# Patient Record
Sex: MALE | Race: WHITE | NOT HISPANIC OR LATINO | Employment: FULL TIME | URBAN - METROPOLITAN AREA
[De-identification: names, ages, dates, MRNs, and addresses within clinical notes are randomized per-mention and may not be internally consistent; named-entity substitution may affect disease eponyms.]

---

## 2018-05-23 LAB — HBA1C MFR BLD HPLC: 5.4 %

## 2018-08-08 RX ORDER — IBUPROFEN 200 MG
400 TABLET ORAL EVERY 6 HOURS PRN
COMMUNITY

## 2018-08-13 ENCOUNTER — OFFICE VISIT (OUTPATIENT)
Dept: FAMILY MEDICINE CLINIC | Facility: CLINIC | Age: 48
End: 2018-08-13
Payer: COMMERCIAL

## 2018-08-13 VITALS
SYSTOLIC BLOOD PRESSURE: 128 MMHG | DIASTOLIC BLOOD PRESSURE: 76 MMHG | HEART RATE: 75 BPM | BODY MASS INDEX: 28.58 KG/M2 | HEIGHT: 70 IN | OXYGEN SATURATION: 97 % | WEIGHT: 199.6 LBS

## 2018-08-13 DIAGNOSIS — R10.13 EPIGASTRIC PAIN: Primary | ICD-10-CM

## 2018-08-13 DIAGNOSIS — E78.00 PURE HYPERCHOLESTEROLEMIA: ICD-10-CM

## 2018-08-13 DIAGNOSIS — I10 BENIGN ESSENTIAL HYPERTENSION: ICD-10-CM

## 2018-08-13 PROCEDURE — 3074F SYST BP LT 130 MM HG: CPT | Performed by: INTERNAL MEDICINE

## 2018-08-13 PROCEDURE — 1036F TOBACCO NON-USER: CPT | Performed by: INTERNAL MEDICINE

## 2018-08-13 PROCEDURE — 99204 OFFICE O/P NEW MOD 45 MIN: CPT | Performed by: INTERNAL MEDICINE

## 2018-08-13 PROCEDURE — 3078F DIAST BP <80 MM HG: CPT | Performed by: INTERNAL MEDICINE

## 2018-08-13 PROCEDURE — 3008F BODY MASS INDEX DOCD: CPT | Performed by: INTERNAL MEDICINE

## 2018-08-13 RX ORDER — CHLORAL HYDRATE 500 MG
1000 CAPSULE ORAL DAILY
COMMUNITY
End: 2019-11-19

## 2018-08-13 NOTE — ASSESSMENT & PLAN NOTE
Based on most recent cholesterol numbers from May the 10 year risk of a heart attack is approximately 4 5%  Would recommend decreasingfat content in the diet but no recommendation for treatment at this time

## 2018-08-13 NOTE — PROGRESS NOTES
Assessment/Plan:    Epigastric pain  Symptoms could be related to gall bladder disease  Cardiac disease is less likely especially with 2 normal previous cardiac catheterizations  Will check abdominal ultrasound, CBC, BMP, and liver tests  Pure hypercholesterolemia  Based on most recent cholesterol numbers from May the 10 year risk of a heart attack is approximately 4 5%  Would recommend decreasingfat content in the diet but no recommendation for treatment at this time  Benign essential hypertension    Normal blood pressure today without medication  Chief complaint: May be having gallbladder problems    HPI:    Sarah Schrader is a 51 y o  male who is here to establish care  He reports 2 episodes of pain just distal to the sternum  The first was was about 10 days ago  The pain radiated into the right lower chest and lasted about 30 minutes  He did not take anything for the discomfort  He did not have nausea or diaphoresis  The pain resolved after 30 minutes  The meal did not precipitate the symptoms  The second episode was a week ago and was very similar to the first episode  He went out to eat a burger afterwards and felt fine  He had some loose bowel movements around the time of each episode  No fevers, chills, sweats, dysuria, hematuria or weight loss  He had been taking red yeast rice but he stopped it because he thought it was affecting his stomach  Past Medical History:   Diagnosis Date    Chest pain     Has had 3 cardiac catheterizations over the last 15 years or so  The first one revealed some coronary calcification but the subsequent 2 cardiac catheterizations did not have any evidence of coronary artery disease   ED (erectile dysfunction)     Eosinophilic esophagitis     GERD (gastroesophageal reflux disease)     Hypercholesterolemia     Hypertension     Patellofemoral syndrome     Stricture of esophagus     Tubulovillous adenoma polyp of colon     w/ mild dysplasia  Last colonoscopy in 2014 with hyperplastic polyp proximal rectum, internal hemorrhoids    Vitamin D deficiency         History reviewed  No pertinent surgical history  Family History   Problem Relation Age of Onset    Breast cancer Mother     Skin cancer Father     Lung cancer Father     Hypertension Father     Pancreatic cancer Maternal Grandfather     Prostate cancer Maternal Grandfather         Social History     Social History    Marital status:      Spouse name: N/A    Number of children: N/A    Years of education: N/A     Occupational History    Not on file  Social History Main Topics    Smoking status: Never Smoker    Smokeless tobacco: Never Used    Alcohol use Yes      Comment: rarely    Drug use: No    Sexual activity: Not on file     Other Topics Concern    Not on file     Social History Narrative    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[facility] [employer]          Current Outpatient Prescriptions   Medication Sig Dispense Refill    Cholecalciferol (VITAMIN D-3) 5000 units TABS Take 1 tablet by mouth daily      ibuprofen (MOTRIN) 200 mg tablet Take 400 mg by mouth every 6 (six) hours as needed      Omega-3 Fatty Acids (FISH OIL) 1,000 mg Take 1,000 mg by mouth daily       No current facility-administered medications for this visit  Allergies   Allergen Reactions    Lisinopril Hives       Review of Systems   Constitutional: Negative for unexpected weight change  Per HPI  HENT: Negative for postnasal drip, rhinorrhea and sneezing  Eyes: Negative for visual disturbance  Respiratory: Negative for cough, shortness of breath and wheezing  Cardiovascular:        Per HPI  Gastrointestinal:        As per HPI  Endocrine: Negative for polydipsia and polyuria  Genitourinary:        As per HPI  Musculoskeletal: Negative for arthralgias  Skin: Negative for color change  Rash:  has a spot on his right forearm that has been there for about a decade    He picks at it from time to time  Neurological: Negative for seizures and syncope  Hematological: Negative for adenopathy  /76 (BP Location: Right arm, Patient Position: Sitting, Cuff Size: Standard)   Pulse 75   Ht 5' 10" (1 778 m)   Wt 90 5 kg (199 lb 9 6 oz)   SpO2 97%   BMI 28 64 kg/m²     General:  Well-developed, well-nourished, in no acute distress  Skin:  Warm, moist, excoriated papule right forearm dorsum  HENT:  Normocephalic, atraumatic, tympanic membranes clear bilaterally, ear canals unremarkable bilaterally, nasal mucosa without lesions, oropharynx was clear without exudate  Eyes: PERRL, EOMI, conjunctivae normal   Neck:  No thyromegaly, thyroid nodules or cervical lymphadenopathy  Cardiac:  Regular rate and rhythm, no murmur, gallop, or rub  There is no JVD or HJR  Lungs:  Clear to auscultation and percussion  Abdomen:  Soft, nontender, normoactive bowel sounds, no palpable masses, no hepatosplenomegaly  Musculoskeletal:  No clubbing, cyanosis, or edema  Neurologic:  Cranial nerves 2-12 intact, motor was 5/5 in all major groups  Psychiatric:  Mood bright, appropriate affect and insight

## 2018-08-13 NOTE — ASSESSMENT & PLAN NOTE
Symptoms could be related to gall bladder disease  Cardiac disease is less likely especially with 2 normal previous cardiac catheterizations  Will check abdominal ultrasound, CBC, BMP, and liver tests

## 2018-08-13 NOTE — PATIENT INSTRUCTIONS
Epigastric pain  Symptoms could be related to gall bladder disease  Cardiac disease is less likely especially with 2 normal previous cardiac catheterizations  Will check abdominal ultrasound, CBC, BMP, and liver tests  Pure hypercholesterolemia  Based on most recent cholesterol numbers from May the 10 year risk of a heart attack is approximately 4 5%  Would recommend decreasingfat content in the diet but no recommendation for treatment at this time  Benign essential hypertension    Normal blood pressure today without medication

## 2018-08-17 ENCOUNTER — TELEPHONE (OUTPATIENT)
Dept: FAMILY MEDICINE CLINIC | Facility: CLINIC | Age: 48
End: 2018-08-17

## 2018-08-17 DIAGNOSIS — R16.0 LIVER MASS: Primary | ICD-10-CM

## 2018-08-17 NOTE — TELEPHONE ENCOUNTER
I called marked to discuss the findings on his abdominal ultrasound  His liver has findings consistent with fatty liver but there are 2 hypoechoic mass like lesions which were not clearly seen on the CT from 2014  The radiologist recommended a dedicated liver MRI or CT to further characterize this abnormality  There also to gallbladder polyps for which they recommended continued surveillance  We discussed the next step  Given that there appeared to have been something not clearly delineated on the previous CT of the abdomen, I would recommend pursuing an MRI in hopes that this would better delineate the findings in the liver  Erickson Gao raised the issue of whether he could have pancreatic cancer because his grandfather had pancreatic cancer  Given that he has intermittent symptoms and no weight loss I think that the likelihood of pancreatic cancer at this time is much lower

## 2018-08-31 ENCOUNTER — HOSPITAL ENCOUNTER (OUTPATIENT)
Dept: MRI IMAGING | Facility: HOSPITAL | Age: 48
Discharge: HOME | End: 2018-08-31
Payer: COMMERCIAL

## 2018-08-31 DIAGNOSIS — R16.0 LIVER MASS: ICD-10-CM

## 2018-08-31 PROCEDURE — 74183 MRI ABD W/O CNTR FLWD CNTR: CPT

## 2018-08-31 PROCEDURE — A9585 GADOBUTROL INJECTION: HCPCS | Performed by: INTERNAL MEDICINE

## 2018-08-31 RX ADMIN — GADOBUTROL 9 ML: 604.72 INJECTION INTRAVENOUS at 23:49

## 2018-09-04 ENCOUNTER — PATIENT MESSAGE (OUTPATIENT)
Dept: FAMILY MEDICINE CLINIC | Facility: CLINIC | Age: 48
End: 2018-09-04

## 2018-09-04 ENCOUNTER — TELEPHONE (OUTPATIENT)
Dept: FAMILY MEDICINE CLINIC | Facility: CLINIC | Age: 48
End: 2018-09-04

## 2018-10-01 ENCOUNTER — TELEPHONE (OUTPATIENT)
Dept: FAMILY MEDICINE CLINIC | Facility: CLINIC | Age: 48
End: 2018-10-01

## 2018-10-01 NOTE — TELEPHONE ENCOUNTER
Patient was wondering if radiology had looked at his CT scan from 2014  He said you were going to have them re-read the results  He called to see if this was taken care of yet  Do I need to do anything if this was not done?

## 2019-04-15 ENCOUNTER — PATIENT MESSAGE (OUTPATIENT)
Dept: FAMILY MEDICINE CLINIC | Facility: CLINIC | Age: 49
End: 2019-04-15

## 2019-04-15 DIAGNOSIS — E55.9 VITAMIN D DEFICIENCY: Primary | ICD-10-CM

## 2019-10-14 ENCOUNTER — PATIENT MESSAGE (OUTPATIENT)
Dept: FAMILY MEDICINE CLINIC | Facility: CLINIC | Age: 49
End: 2019-10-14

## 2019-10-21 ENCOUNTER — TELEPHONE (OUTPATIENT)
Dept: FAMILY MEDICINE CLINIC | Facility: CLINIC | Age: 49
End: 2019-10-21

## 2019-10-21 DIAGNOSIS — E55.9 VITAMIN D DEFICIENCY: ICD-10-CM

## 2019-10-21 DIAGNOSIS — E78.2 MIXED HYPERLIPIDEMIA: Primary | ICD-10-CM

## 2019-10-21 DIAGNOSIS — R16.0 LIVER MASS: ICD-10-CM

## 2019-10-21 NOTE — TELEPHONE ENCOUNTER
Patient wants to go this week for labs   Will you please order current labs that you would like him to have

## 2019-11-11 LAB
CHOLEST SERPL-MCNC: 279 MG/DL (ref ?–200)
HDLC SERPL-MCNC: 44 MG/DL (ref 40–60)
LDLC SERPL CALC-MCNC: 174 MG/DL (ref 0–100)
TRIGL SERPL-MCNC: 307 MG/DL (ref ?–150)

## 2019-11-19 ENCOUNTER — TRANSCRIBE ORDERS (OUTPATIENT)
Dept: ADMINISTRATIVE | Facility: HOSPITAL | Age: 49
End: 2019-11-19

## 2019-11-19 ENCOUNTER — APPOINTMENT (OUTPATIENT)
Dept: LAB | Facility: CLINIC | Age: 49
End: 2019-11-19
Payer: COMMERCIAL

## 2019-11-19 ENCOUNTER — OFFICE VISIT (OUTPATIENT)
Dept: FAMILY MEDICINE CLINIC | Facility: CLINIC | Age: 49
End: 2019-11-19
Payer: COMMERCIAL

## 2019-11-19 VITALS
HEART RATE: 71 BPM | BODY MASS INDEX: 26.9 KG/M2 | HEIGHT: 72 IN | WEIGHT: 198.63 LBS | SYSTOLIC BLOOD PRESSURE: 140 MMHG | OXYGEN SATURATION: 97 % | DIASTOLIC BLOOD PRESSURE: 80 MMHG

## 2019-11-19 DIAGNOSIS — Z12.5 ENCOUNTER FOR PROSTATE CANCER SCREENING: ICD-10-CM

## 2019-11-19 DIAGNOSIS — E55.9 VITAMIN D DEFICIENCY: ICD-10-CM

## 2019-11-19 DIAGNOSIS — Z11.4 SCREENING FOR HIV (HUMAN IMMUNODEFICIENCY VIRUS): ICD-10-CM

## 2019-11-19 DIAGNOSIS — N52.9 VASCULOGENIC ERECTILE DYSFUNCTION, UNSPECIFIED VASCULOGENIC ERECTILE DYSFUNCTION TYPE: ICD-10-CM

## 2019-11-19 DIAGNOSIS — Z23 NEEDS FLU SHOT: Primary | ICD-10-CM

## 2019-11-19 DIAGNOSIS — E78.2 MIXED HYPERLIPIDEMIA: ICD-10-CM

## 2019-11-19 DIAGNOSIS — I10 BENIGN ESSENTIAL HYPERTENSION: ICD-10-CM

## 2019-11-19 PROBLEM — E66.3 OVERWEIGHT (BMI 25.0-29.9): Status: ACTIVE | Noted: 2019-11-19

## 2019-11-19 LAB — PSA SERPL-MCNC: 1 NG/ML (ref 0–4)

## 2019-11-19 PROCEDURE — 87389 HIV-1 AG W/HIV-1&-2 AB AG IA: CPT

## 2019-11-19 PROCEDURE — 36415 COLL VENOUS BLD VENIPUNCTURE: CPT

## 2019-11-19 PROCEDURE — 90471 IMMUNIZATION ADMIN: CPT | Performed by: INTERNAL MEDICINE

## 2019-11-19 PROCEDURE — G0103 PSA SCREENING: HCPCS

## 2019-11-19 PROCEDURE — 90686 IIV4 VACC NO PRSV 0.5 ML IM: CPT | Performed by: INTERNAL MEDICINE

## 2019-11-19 PROCEDURE — 99214 OFFICE O/P EST MOD 30 MIN: CPT | Performed by: INTERNAL MEDICINE

## 2019-11-19 PROCEDURE — 1036F TOBACCO NON-USER: CPT | Performed by: INTERNAL MEDICINE

## 2019-11-19 RX ORDER — TADALAFIL 10 MG/1
20 TABLET ORAL DAILY PRN
Qty: 10 TABLET | Refills: 5 | COMMUNITY
Start: 2019-11-19 | End: 2020-11-19 | Stop reason: SDUPTHER

## 2019-11-19 RX ORDER — OMEPRAZOLE 40 MG/1
40 CAPSULE, DELAYED RELEASE ORAL DAILY
COMMUNITY
Start: 2018-11-15

## 2019-11-19 NOTE — PATIENT INSTRUCTIONS
Mixed hyperlipidemia  Focus on low fat, low carb diet  Benign essential hypertension  Weight loss should help lower blood pressure  Vitamin D deficiency  Vitamin D level was 50  Continue vitamin D 5000 Units daily and continue to monitor  Check PSA and HIV this week  Get other fasting blood tests a few days before return visit

## 2019-11-19 NOTE — PROGRESS NOTES
Assessment/Plan:    Problem List Items Addressed This Visit        Cardiovascular and Mediastinum    Benign essential hypertension     Weight loss should help lower blood pressure  Other    Mixed hyperlipidemia     Focus on low fat, low carb diet  Relevant Orders    Lipid panel    Basic metabolic panel    Vitamin D deficiency     Vitamin D level was 50  Continue vitamin D 5000 Units daily and continue to monitor  Relevant Orders    Vitamin D 25 hydroxy      Other Visit Diagnoses     Needs flu shot    -  Primary    Relevant Orders    influenza vaccine, [id number], quadrivalent, 0 5 mL, preservative-free, for adult and pediatric patients 6 mos+ (AFLURIA, FLUARIX, FLULAVAL, FLUZONE) (Completed)    Vasculogenic erectile dysfunction, unspecified vasculogenic erectile dysfunction type        Relevant Medications    tadalafil (CIALIS) 10 MG tablet    Encounter for prostate cancer screening        Relevant Orders    PSA, Total Screen    Screening for HIV (human immunodeficiency virus)        Relevant Orders    Human Immunodeficiency Virus 1/2 Antigen / Antibody ( Fourth Generation) with Reflex Testing          Chief Complaint     Follow-up          Patient ID: Giovanna Taylor is a 47 y o  male he is scheduled for a vasectomy next month  He has started back on Cialis since getting into a new relationship  He increased Cialis to 20 mg but isn't having as rigid an erection as he would like  He has gained weight and is eating more carbs than he had in the past   He did have his cholecystectomy back in April which apparently just had gallstones in actually no polyp  He does note that since the cholecystectomy, he tends to have more loose bowel movements  He does continue to take 5000 units of vitamin D daily  I reviewed his medication list today        Objective:    /80 (BP Location: Right arm, Patient Position: Sitting, Cuff Size: Adult)   Pulse 71   Ht 6' (1 829 m)   Wt 90 1 kg (198 lb 10 2 oz)   SpO2 97%   BMI 26 94 kg/m²     Wt Readings from Last 3 Encounters:   11/19/19 90 1 kg (198 lb 10 2 oz)   08/13/18 90 5 kg (199 lb 9 6 oz)         Physical Exam    General:  Well-developed, well-nourished, in no acute distress  Cardiac:  Regular rate and rhythm, no murmur, gallop, or rub  There is no JVD or HJR  Lungs:  Clear to auscultation and percussion  Abdomen:  Soft, nontender, normoactive bowel sounds, no palpable masses, no hepatosplenomegaly  Extremities:  No clubbing, cyanosis, or edema  BMI Counseling: Body mass index is 26 94 kg/m²  The BMI is above normal  Nutrition recommendations include consuming healthier snacks and moderation in carbohydrate intake  No pharmacotherapy was ordered           The 10-year ASCVD risk score (Lexi Liriano et al , 2013) is: 7 3%*    Values used to calculate the score:      Age: 47 years      Sex: Male      Is Non- : No      Diabetic: No      Tobacco smoker: No      Systolic Blood Pressure: 903 mmHg      Is BP treated: No      HDL Cholesterol: 44 mg/dL      Total Cholesterol: 279 mg/dL*      * - Cholesterol units were assumed for this score calculation

## 2019-11-20 LAB — HIV 1+2 AB+HIV1 P24 AG SERPL QL IA: NORMAL

## 2020-05-15 ENCOUNTER — TELEPHONE (OUTPATIENT)
Dept: FAMILY MEDICINE CLINIC | Facility: CLINIC | Age: 50
End: 2020-05-15

## 2020-05-19 ENCOUNTER — OFFICE VISIT (OUTPATIENT)
Dept: FAMILY MEDICINE CLINIC | Facility: CLINIC | Age: 50
End: 2020-05-19
Payer: COMMERCIAL

## 2020-05-19 VITALS
BODY MASS INDEX: 26.22 KG/M2 | HEART RATE: 85 BPM | SYSTOLIC BLOOD PRESSURE: 148 MMHG | HEIGHT: 72 IN | WEIGHT: 193.6 LBS | TEMPERATURE: 98.5 F | OXYGEN SATURATION: 97 % | DIASTOLIC BLOOD PRESSURE: 80 MMHG

## 2020-05-19 DIAGNOSIS — R16.0 LIVER MASS: ICD-10-CM

## 2020-05-19 DIAGNOSIS — E55.9 VITAMIN D DEFICIENCY: Primary | ICD-10-CM

## 2020-05-19 DIAGNOSIS — I10 BENIGN ESSENTIAL HYPERTENSION: ICD-10-CM

## 2020-05-19 DIAGNOSIS — K22.2 STRICTURE OF ESOPHAGUS: ICD-10-CM

## 2020-05-19 DIAGNOSIS — Z12.5 ENCOUNTER FOR PROSTATE CANCER SCREENING: ICD-10-CM

## 2020-05-19 DIAGNOSIS — R73.01 IMPAIRED FASTING GLUCOSE: ICD-10-CM

## 2020-05-19 DIAGNOSIS — E78.2 MIXED HYPERLIPIDEMIA: ICD-10-CM

## 2020-05-19 PROCEDURE — 3077F SYST BP >= 140 MM HG: CPT | Performed by: INTERNAL MEDICINE

## 2020-05-19 PROCEDURE — 1036F TOBACCO NON-USER: CPT | Performed by: INTERNAL MEDICINE

## 2020-05-19 PROCEDURE — 99214 OFFICE O/P EST MOD 30 MIN: CPT | Performed by: INTERNAL MEDICINE

## 2020-05-19 PROCEDURE — 3079F DIAST BP 80-89 MM HG: CPT | Performed by: INTERNAL MEDICINE

## 2020-05-19 PROCEDURE — 3008F BODY MASS INDEX DOCD: CPT | Performed by: INTERNAL MEDICINE

## 2020-05-19 RX ORDER — MELATONIN
5000 DAILY
COMMUNITY

## 2020-05-19 RX ORDER — CETIRIZINE HYDROCHLORIDE 10 MG/1
10 TABLET ORAL DAILY
COMMUNITY

## 2020-05-19 RX ORDER — ASCORBIC ACID 500 MG
500 TABLET ORAL DAILY
COMMUNITY

## 2020-11-19 ENCOUNTER — OFFICE VISIT (OUTPATIENT)
Dept: FAMILY MEDICINE CLINIC | Facility: CLINIC | Age: 50
End: 2020-11-19
Payer: COMMERCIAL

## 2020-11-19 VITALS
HEIGHT: 72 IN | SYSTOLIC BLOOD PRESSURE: 132 MMHG | WEIGHT: 198 LBS | HEART RATE: 82 BPM | DIASTOLIC BLOOD PRESSURE: 80 MMHG | OXYGEN SATURATION: 98 % | TEMPERATURE: 97.3 F | BODY MASS INDEX: 26.82 KG/M2

## 2020-11-19 DIAGNOSIS — Z00.00 ANNUAL PHYSICAL EXAM: ICD-10-CM

## 2020-11-19 DIAGNOSIS — E66.3 OVERWEIGHT WITH BODY MASS INDEX (BMI) OF 26 TO 26.9 IN ADULT: ICD-10-CM

## 2020-11-19 DIAGNOSIS — Z23 NEEDS FLU SHOT: Primary | ICD-10-CM

## 2020-11-19 DIAGNOSIS — N52.9 VASCULOGENIC ERECTILE DYSFUNCTION, UNSPECIFIED VASCULOGENIC ERECTILE DYSFUNCTION TYPE: ICD-10-CM

## 2020-11-19 DIAGNOSIS — E78.2 MIXED HYPERLIPIDEMIA: ICD-10-CM

## 2020-11-19 DIAGNOSIS — R16.0 LIVER MASS: ICD-10-CM

## 2020-11-19 DIAGNOSIS — I10 BENIGN ESSENTIAL HYPERTENSION: ICD-10-CM

## 2020-11-19 DIAGNOSIS — E55.9 VITAMIN D DEFICIENCY: ICD-10-CM

## 2020-11-19 PROCEDURE — 3075F SYST BP GE 130 - 139MM HG: CPT | Performed by: INTERNAL MEDICINE

## 2020-11-19 PROCEDURE — 1036F TOBACCO NON-USER: CPT | Performed by: INTERNAL MEDICINE

## 2020-11-19 PROCEDURE — 3725F SCREEN DEPRESSION PERFORMED: CPT | Performed by: INTERNAL MEDICINE

## 2020-11-19 PROCEDURE — 3008F BODY MASS INDEX DOCD: CPT | Performed by: INTERNAL MEDICINE

## 2020-11-19 PROCEDURE — 3079F DIAST BP 80-89 MM HG: CPT | Performed by: INTERNAL MEDICINE

## 2020-11-19 PROCEDURE — 99396 PREV VISIT EST AGE 40-64: CPT | Performed by: INTERNAL MEDICINE

## 2020-11-19 RX ORDER — TADALAFIL 10 MG/1
20 TABLET ORAL DAILY PRN
Qty: 10 TABLET | Refills: 5 | Status: SHIPPED | OUTPATIENT
Start: 2020-11-19

## 2020-11-19 RX ORDER — CHOLESTYRAMINE 4 G/9G
POWDER, FOR SUSPENSION ORAL
COMMUNITY
End: 2021-05-28

## 2020-11-30 ENCOUNTER — VBI (OUTPATIENT)
Dept: ADMINISTRATIVE | Facility: OTHER | Age: 50
End: 2020-11-30

## 2021-06-01 ENCOUNTER — OFFICE VISIT (OUTPATIENT)
Dept: FAMILY MEDICINE CLINIC | Facility: CLINIC | Age: 51
End: 2021-06-01
Payer: COMMERCIAL

## 2021-06-01 VITALS
WEIGHT: 200.8 LBS | TEMPERATURE: 97.6 F | SYSTOLIC BLOOD PRESSURE: 130 MMHG | HEIGHT: 72 IN | BODY MASS INDEX: 27.2 KG/M2 | OXYGEN SATURATION: 98 % | DIASTOLIC BLOOD PRESSURE: 78 MMHG | HEART RATE: 86 BPM

## 2021-06-01 DIAGNOSIS — E55.9 VITAMIN D DEFICIENCY: ICD-10-CM

## 2021-06-01 DIAGNOSIS — E78.2 MIXED HYPERLIPIDEMIA: Primary | ICD-10-CM

## 2021-06-01 DIAGNOSIS — I10 BENIGN ESSENTIAL HYPERTENSION: ICD-10-CM

## 2021-06-01 DIAGNOSIS — E66.3 OVERWEIGHT WITH BODY MASS INDEX (BMI) OF 26 TO 26.9 IN ADULT: ICD-10-CM

## 2021-06-01 PROCEDURE — 3008F BODY MASS INDEX DOCD: CPT | Performed by: INTERNAL MEDICINE

## 2021-06-01 PROCEDURE — 3078F DIAST BP <80 MM HG: CPT | Performed by: INTERNAL MEDICINE

## 2021-06-01 PROCEDURE — 3725F SCREEN DEPRESSION PERFORMED: CPT | Performed by: INTERNAL MEDICINE

## 2021-06-01 PROCEDURE — 99214 OFFICE O/P EST MOD 30 MIN: CPT | Performed by: INTERNAL MEDICINE

## 2021-06-01 PROCEDURE — 1036F TOBACCO NON-USER: CPT | Performed by: INTERNAL MEDICINE

## 2021-06-01 PROCEDURE — 3075F SYST BP GE 130 - 139MM HG: CPT | Performed by: INTERNAL MEDICINE

## 2021-06-01 RX ORDER — ATORVASTATIN CALCIUM 40 MG/1
40 TABLET, FILM COATED ORAL DAILY
Qty: 30 TABLET | Refills: 5 | Status: SHIPPED | OUTPATIENT
Start: 2021-06-01 | End: 2021-11-09

## 2021-06-01 NOTE — ASSESSMENT & PLAN NOTE
Lipids are significantly elevate but diet has not been good  Will start atorvastatin 40 mg daily  Recheck lipid panel in 3 months  Cut back on fried foods and animal protein with physical fat

## 2021-06-01 NOTE — PROGRESS NOTES
Assessment/Plan:    Problem List Items Addressed This Visit        Cardiovascular and Mediastinum    Benign essential hypertension     It is interesting that his blood pressure is normal here today but has been elevated a couple of times recently  I would continue to monitor blood pressure and hopefully, his blood pressure will drop further with weight loss  Other    Mixed hyperlipidemia - Primary     Lipids are significantly elevate but diet has not been good  Will start atorvastatin 40 mg daily  Recheck lipid panel in 3 months  Cut back on fried foods and animal protein with physical fat  Relevant Medications    atorvastatin (LIPITOR) 40 mg tablet    Other Relevant Orders    Basic metabolic panel    Lipid panel    ALT    Vitamin D deficiency     His vitamin-D level is at 50  Continue vitamin-D supplementation  Overweight with body mass index (BMI) of 26 to 26 9 in adult     I encouraged him to cut out the potato chips and increase his intake of fruits and vegetables  BMI Counseling: Body mass index is 27 23 kg/m²  The BMI is above normal  Nutrition recommendations include encouraging healthy choices of fruits and vegetables and consuming healthier snacks  Chief Complaint     Follow-up; Care Gap Request          Patient ID: Synthia Krabbe is a 45 y o  male who returns for routine follow up  He has been getting his blood pressure checked at the chiropractor and his BPs have been elevated  It was also elevated when he went to donate blood recently  He hasn't been eating healthy (has been snacking on chips) and hasn't been exercising as much as he should be  He is very busy at work         Objective:    /78 (BP Location: Left arm, Patient Position: Sitting, Cuff Size: Large)   Pulse 86   Temp 97 6 °F (36 4 °C)   Ht 6' (1 829 m)   Wt 91 1 kg (200 lb 12 8 oz)   SpO2 98%   BMI 27 23 kg/m²     Wt Readings from Last 10 Encounters:   06/01/21 91 1 kg (200 lb 12 8 oz)   11/19/20 89 8 kg (198 lb)   05/19/20 87 8 kg (193 lb 9 6 oz)   11/19/19 90 1 kg (198 lb 10 2 oz)   08/13/18 90 5 kg (199 lb 9 6 oz)         Physical Exam    General:  Well-developed, well-nourished, in no acute distress  Cardiac:  Regular rate and rhythm, no murmur, gallop, or rub  There is no JVD or HJR  Lungs:  Clear to auscultation and percussion  Abdomen:  Soft, nontender, normoactive bowel sounds, no palpable masses, no hepatosplenomegaly  Extremities:  No clubbing, cyanosis, or edema

## 2021-06-02 NOTE — ASSESSMENT & PLAN NOTE
It is interesting that his blood pressure is normal here today but has been elevated a couple of times recently  I would continue to monitor blood pressure and hopefully, his blood pressure will drop further with weight loss

## 2021-11-09 DIAGNOSIS — E78.2 MIXED HYPERLIPIDEMIA: ICD-10-CM

## 2021-11-09 RX ORDER — ATORVASTATIN CALCIUM 40 MG/1
TABLET, FILM COATED ORAL
Qty: 30 TABLET | Refills: 5 | Status: SHIPPED | OUTPATIENT
Start: 2021-11-09 | End: 2022-07-08 | Stop reason: SDUPTHER

## 2021-12-02 ENCOUNTER — TELEMEDICINE (OUTPATIENT)
Dept: FAMILY MEDICINE CLINIC | Facility: CLINIC | Age: 51
End: 2021-12-02
Payer: COMMERCIAL

## 2021-12-02 VITALS — WEIGHT: 200 LBS | HEIGHT: 72 IN | BODY MASS INDEX: 27.09 KG/M2

## 2021-12-02 DIAGNOSIS — J34.89 SINUS PRESSURE: ICD-10-CM

## 2021-12-02 DIAGNOSIS — R09.82 POST-NASAL DRIP: ICD-10-CM

## 2021-12-02 DIAGNOSIS — R05.9 COUGH: ICD-10-CM

## 2021-12-02 DIAGNOSIS — J01.11 ACUTE RECURRENT FRONTAL SINUSITIS: Primary | ICD-10-CM

## 2021-12-02 PROCEDURE — 99213 OFFICE O/P EST LOW 20 MIN: CPT | Performed by: NURSE PRACTITIONER

## 2021-12-02 RX ORDER — AZITHROMYCIN 250 MG/1
TABLET, FILM COATED ORAL
Qty: 6 TABLET | Refills: 0 | Status: SHIPPED | OUTPATIENT
Start: 2021-12-02 | End: 2021-12-07

## 2021-12-07 ENCOUNTER — OFFICE VISIT (OUTPATIENT)
Dept: FAMILY MEDICINE CLINIC | Facility: CLINIC | Age: 51
End: 2021-12-07
Payer: COMMERCIAL

## 2021-12-07 VITALS
BODY MASS INDEX: 27.63 KG/M2 | HEIGHT: 72 IN | HEART RATE: 85 BPM | DIASTOLIC BLOOD PRESSURE: 88 MMHG | TEMPERATURE: 98.2 F | OXYGEN SATURATION: 98 % | SYSTOLIC BLOOD PRESSURE: 172 MMHG | WEIGHT: 204 LBS

## 2021-12-07 DIAGNOSIS — R43.0 ANOSMIA: ICD-10-CM

## 2021-12-07 DIAGNOSIS — E55.9 VITAMIN D DEFICIENCY: ICD-10-CM

## 2021-12-07 DIAGNOSIS — Z20.822 SUSPECTED COVID-19 VIRUS INFECTION: ICD-10-CM

## 2021-12-07 DIAGNOSIS — I10 BENIGN ESSENTIAL HYPERTENSION: Primary | ICD-10-CM

## 2021-12-07 DIAGNOSIS — E78.2 MIXED HYPERLIPIDEMIA: ICD-10-CM

## 2021-12-07 PROCEDURE — U0003 INFECTIOUS AGENT DETECTION BY NUCLEIC ACID (DNA OR RNA); SEVERE ACUTE RESPIRATORY SYNDROME CORONAVIRUS 2 (SARS-COV-2) (CORONAVIRUS DISEASE [COVID-19]), AMPLIFIED PROBE TECHNIQUE, MAKING USE OF HIGH THROUGHPUT TECHNOLOGIES AS DESCRIBED BY CMS-2020-01-R: HCPCS | Performed by: INTERNAL MEDICINE

## 2021-12-07 PROCEDURE — 3725F SCREEN DEPRESSION PERFORMED: CPT | Performed by: INTERNAL MEDICINE

## 2021-12-07 PROCEDURE — U0005 INFEC AGEN DETEC AMPLI PROBE: HCPCS | Performed by: INTERNAL MEDICINE

## 2021-12-07 PROCEDURE — 3008F BODY MASS INDEX DOCD: CPT | Performed by: INTERNAL MEDICINE

## 2021-12-07 PROCEDURE — 99214 OFFICE O/P EST MOD 30 MIN: CPT | Performed by: INTERNAL MEDICINE

## 2021-12-07 PROCEDURE — 1036F TOBACCO NON-USER: CPT | Performed by: INTERNAL MEDICINE

## 2021-12-09 LAB — SARS-COV-2 RNA RESP QL NAA+PROBE: POSITIVE

## 2022-05-26 ENCOUNTER — RA CDI HCC (OUTPATIENT)
Dept: OTHER | Facility: HOSPITAL | Age: 52
End: 2022-05-26

## 2022-05-26 NOTE — PROGRESS NOTES
[facility]  coding opportunities       Chart reviewed, no opportunity found: CHART REVIEWED, NO OPPORTUNITY FOUND        Patients Insurance        Commercial Insurance: [address] [place] [address]

## 2022-07-08 DIAGNOSIS — E78.2 MIXED HYPERLIPIDEMIA: ICD-10-CM

## 2022-07-08 RX ORDER — ATORVASTATIN CALCIUM 40 MG/1
TABLET, FILM COATED ORAL
Qty: 30 TABLET | Refills: 5 | Status: SHIPPED | OUTPATIENT
Start: 2022-07-08

## 2022-10-24 ENCOUNTER — VBI (OUTPATIENT)
Dept: ADMINISTRATIVE | Facility: OTHER | Age: 52
End: 2022-10-24

## 2022-12-12 DIAGNOSIS — N52.9 VASCULOGENIC ERECTILE DYSFUNCTION, UNSPECIFIED VASCULOGENIC ERECTILE DYSFUNCTION TYPE: ICD-10-CM

## 2022-12-14 DIAGNOSIS — K22.2 STRICTURE OF ESOPHAGUS: Primary | ICD-10-CM

## 2022-12-14 RX ORDER — OMEPRAZOLE 40 MG/1
40 CAPSULE, DELAYED RELEASE ORAL DAILY
Qty: 30 CAPSULE | Refills: 5 | Status: SHIPPED | OUTPATIENT
Start: 2022-12-14

## 2022-12-14 RX ORDER — TADALAFIL 10 MG/1
20 TABLET ORAL DAILY PRN
Qty: 10 TABLET | Refills: 5 | Status: SHIPPED | OUTPATIENT
Start: 2022-12-14

## 2022-12-21 ENCOUNTER — VBI (OUTPATIENT)
Dept: ADMINISTRATIVE | Facility: OTHER | Age: 52
End: 2022-12-21

## 2023-06-14 DIAGNOSIS — E78.2 MIXED HYPERLIPIDEMIA: ICD-10-CM

## 2023-06-14 RX ORDER — ATORVASTATIN CALCIUM 40 MG/1
40 TABLET, FILM COATED ORAL DAILY
Qty: 30 TABLET | Refills: 1 | Status: SHIPPED | OUTPATIENT
Start: 2023-06-14

## 2023-06-23 DIAGNOSIS — K22.2 STRICTURE OF ESOPHAGUS: ICD-10-CM

## 2023-06-23 RX ORDER — OMEPRAZOLE 40 MG/1
CAPSULE, DELAYED RELEASE ORAL
Qty: 30 CAPSULE | Refills: 3 | Status: SHIPPED | OUTPATIENT
Start: 2023-06-23

## 2023-08-02 DIAGNOSIS — K22.2 STRICTURE OF ESOPHAGUS: ICD-10-CM

## 2023-08-02 DIAGNOSIS — E78.2 MIXED HYPERLIPIDEMIA: ICD-10-CM

## 2023-08-02 RX ORDER — ATORVASTATIN CALCIUM 40 MG/1
40 TABLET, FILM COATED ORAL DAILY
Qty: 30 TABLET | Refills: 1 | Status: SHIPPED | OUTPATIENT
Start: 2023-08-02

## 2023-08-02 RX ORDER — OMEPRAZOLE 40 MG/1
40 CAPSULE, DELAYED RELEASE ORAL DAILY
Qty: 30 CAPSULE | Refills: 0 | Status: SHIPPED | OUTPATIENT
Start: 2023-08-02

## 2023-11-21 ENCOUNTER — TELEPHONE (OUTPATIENT)
Dept: FAMILY MEDICINE CLINIC | Facility: CLINIC | Age: 53
End: 2023-11-21

## 2023-11-21 ENCOUNTER — OFFICE VISIT (OUTPATIENT)
Dept: FAMILY MEDICINE CLINIC | Facility: CLINIC | Age: 53
End: 2023-11-21
Payer: COMMERCIAL

## 2023-11-21 VITALS
BODY MASS INDEX: 28.06 KG/M2 | SYSTOLIC BLOOD PRESSURE: 166 MMHG | HEIGHT: 71 IN | DIASTOLIC BLOOD PRESSURE: 84 MMHG | OXYGEN SATURATION: 96 % | HEART RATE: 85 BPM | WEIGHT: 200.4 LBS

## 2023-11-21 DIAGNOSIS — K22.2 STRICTURE OF ESOPHAGUS: Primary | ICD-10-CM

## 2023-11-21 DIAGNOSIS — N52.9 VASCULOGENIC ERECTILE DYSFUNCTION, UNSPECIFIED VASCULOGENIC ERECTILE DYSFUNCTION TYPE: ICD-10-CM

## 2023-11-21 DIAGNOSIS — H61.23 BILATERAL IMPACTED CERUMEN: ICD-10-CM

## 2023-11-21 DIAGNOSIS — E66.3 OVERWEIGHT WITH BODY MASS INDEX (BMI) OF 27 TO 27.9 IN ADULT: ICD-10-CM

## 2023-11-21 DIAGNOSIS — E55.9 VITAMIN D DEFICIENCY: ICD-10-CM

## 2023-11-21 DIAGNOSIS — I10 BENIGN ESSENTIAL HYPERTENSION: ICD-10-CM

## 2023-11-21 DIAGNOSIS — R06.83 LOUD SNORING: ICD-10-CM

## 2023-11-21 DIAGNOSIS — E78.2 MIXED HYPERLIPIDEMIA: ICD-10-CM

## 2023-11-21 DIAGNOSIS — K76.0 FATTY LIVER: ICD-10-CM

## 2023-11-21 PROCEDURE — 99214 OFFICE O/P EST MOD 30 MIN: CPT | Performed by: INTERNAL MEDICINE

## 2023-11-21 PROCEDURE — 99396 PREV VISIT EST AGE 40-64: CPT | Performed by: INTERNAL MEDICINE

## 2023-11-21 PROCEDURE — 69210 REMOVE IMPACTED EAR WAX UNI: CPT | Performed by: INTERNAL MEDICINE

## 2023-11-21 RX ORDER — AMLODIPINE BESYLATE 5 MG/1
5 TABLET ORAL DAILY
Qty: 30 TABLET | Refills: 5 | Status: SHIPPED | OUTPATIENT
Start: 2023-11-21

## 2023-11-21 RX ORDER — OMEPRAZOLE 40 MG/1
40 CAPSULE, DELAYED RELEASE ORAL DAILY
Qty: 30 CAPSULE | Refills: 5 | Status: SHIPPED | OUTPATIENT
Start: 2023-11-21

## 2023-11-21 RX ORDER — TADALAFIL 10 MG/1
20 TABLET ORAL DAILY PRN
Qty: 10 TABLET | Refills: 5 | Status: SHIPPED | OUTPATIENT
Start: 2023-11-21

## 2023-11-21 RX ORDER — ATORVASTATIN CALCIUM 40 MG/1
40 TABLET, FILM COATED ORAL DAILY
Qty: 30 TABLET | Refills: 5 | Status: SHIPPED | OUTPATIENT
Start: 2023-11-21

## 2023-11-21 NOTE — PROGRESS NOTES
Assessment/Plan:    Problem List Items Addressed This Visit        Digestive    Stricture of esophagus - Primary     Stable. He will continue omeprazole 40mg daily, which is refilled today. Relevant Medications    omeprazole (PriLOSEC) 40 MG capsule       Cardiovascular and Mediastinum    Benign essential hypertension     His repeat blood pressure was 166/84 mmHg. Started him on amlodipine 5 mg daily. The patient will follow up in a couple of weeks to recheck his blood pressure. Relevant Medications    amLODIPine (NORVASC) 5 mg tablet       Other    Mixed hyperlipidemia     Recent labs show improvement. He will continue Lipitor at the current dose; refill provided. Relevant Medications    atorvastatin (LIPITOR) 40 mg tablet    Other Relevant Orders    Basic metabolic panel    Hepatic function panel    Vitamin D deficiency     Vitamin D was 42.7 in June. Overweight with body mass index (BMI) of 27 to 27.9 in adult   Other Visit Diagnoses     Fatty liver        Relevant Orders    US abdomen complete    Vasculogenic erectile dysfunction, unspecified vasculogenic erectile dysfunction type        Relevant Medications    tadalafil (CIALIS) 10 MG tablet    Loud snoring        Relevant Orders    Diagnostic Sleep Study    Bilateral impacted cerumen        Relevant Orders    Ear cerumen removal (Completed)        Snoring, sleepiness, and fatigue. He scores in the high-risk range. Ordered a sleep study. Fatty liver  We will order an abdominal ultrasound and liver test.    Healthcare maintenance  Routine labs were ordered. Cerumen impaction. Curette and irrigation successful on the left, still has residual cerumen on the right. He will use over-the-counter Debrox 5 drops in the right ear 3 times a day for a couple of weeks. Chief Complaint    Care Gap Request; Physical Exam         Patient ID: Margaret Strickland is a 46 y.o. male who returns for a preventive visit. He snores loudly. He often feels tired, fatigued, and sleepy during the day. His partner has observed that he stops breathing during sleep. He got a mouthpiece that seemed to work temporarily until his blood pressure is high. He does not exercise. His neck size is 17.5 inches. He had a sleep study 20 years ago when he was  and that came back normal.    He is not currently on any medication for blood pressure. He was on verapamil a long time ago. He was allergic to LISINOPRIL. His numbers are amazing since he was started on atorvastatin 40 mg daily. He denies any side effects. He denies any pain or joint pain. He needs refills on his Lipitor. He takes omeprazole 40 mg daily for his reflux esophagitis. He was originally told to take 2 a day, but he went to one a day because when he had his last colonoscopy and endoscopy, they noticed erosion. He has not gone back to his doctor. He has good moments and bad moments with his bowels. He has some moles. He has not seen his dermatologist in a while. He had fatty liver and had chemotherapy. It has been 3 to 4 years since he had his liver checked. He had an ultrasound in 02/2023 for kidney stones. He takes vitamin D gummies. He states he woke up really congested last Monday (11/13/2023). He started Mucinex. He has not had any fever. He took NyQuil for the first time for 3 nights. His sinuses are always between dust and other stuff. He took Mucinex 12-hour for 8 days. He did not take it last night. His chest was clear. His right ear was clogged. He has not had any pain. He had tubes in his ears as a child. He does not use Q-tips. Supplemental Information      His daughter, Pretty Wolff, who is 22, was recently diagnosed with Crohn's disease; she also has lupus. He is allergic to LISINOPRIL.       STOP-BANG Sleep Apnea Questionnaire  [facility] 2008 and [facility] 2012    STOP  Do you SNORE loudly (louder than talking or loud  enough to be heard through closed doors)? yes  Do you often feel TIRED, fatigued, or sleepy during  daytime? yes  Has anyone OBSERVED you stop breathing during  your sleep? yes  Do you have or are you being treated for high blood  PRESSURE?  no    BANG  BMI more than 35kg/m2? no  AGE over 48years old? yes  NECK circumference > 16 inches (40cm)? yes  GENDER: Male? yes  TOTAL SCORE  High risk of COLT: Yes 5 - 8  Intermediate risk of COLT: Yes 3 - 4  Low risk of COLT: Yes 0 - 2   Objective:    /84 (BP Location: Left arm, Cuff Size: Large)   Pulse 85   Ht 5' 11" (1.803 m)   Wt 90.9 kg (200 lb 6.4 oz)   SpO2 96%   BMI 27.95 kg/m²     Wt Readings from Last 3 Encounters:   11/21/23 90.9 kg (200 lb 6.4 oz)   12/07/21 92.5 kg (204 lb)   12/02/21 90.7 kg (200 lb)         Physical Exam  General: Well-developed, well-nourished, in no acute distress. HEENT: Cerumen impaction in the right external canal. Some cerumen is also noted in the left ear. Neck: Neck circumference is 17 inches. Cardiac: Regular rate and rhythm, no murmur, gallop, or rub. There is no JVD or HJR. Lungs: Clear to auscultation and percussion. Abdomen: Soft, nontender, normoactive bowel sounds, no palpable masses, no hepatosplenomegaly. Extremities: No clubbing, cyanosis, or edema. Skin: A few scattered benign-appearing moles on the back. Ear cerumen removal    Date/Time: 11/21/2023 11:15 AM    Performed by: Tilda Riedel, MD  Authorized by: Tilda Riedel, MD    Patient location:  Clinic  Procedure details:     Local anesthetic:  None    Location:  R ear and L ear    Procedure type: irrigation with instrumentation      Instrumentation: curette      Approach:  External  Post-procedure details:     Complication:  None    Hearing quality:  Improved    Patient tolerance of procedure: Tolerated well, no immediate complications  Comments:      Cerumen was cleared with curette and irrigation on the left ear.   Curetting and irrigating the right ear led to removal of some of the cerumen but there is still evidence of occlusion. Recommended Debrox 5 drops 3 times a day for the next 7-10 days. His labs in 06/2023 showed his cholesterol was 286 mg/dl, now dropped to 160 mg/dl and his LDL cholesterol went form 199 mg/dl to 95 mg/dl. His vitamin D was normal.        Transcribed by Mary Sousa. David on 11/21/2023.

## 2023-11-21 NOTE — TELEPHONE ENCOUNTER
Patient was seen in office today, he said he was told about over the counter ear drops and he can't recall the name of them.

## 2023-11-22 NOTE — ASSESSMENT & PLAN NOTE
His repeat blood pressure was 166/84 mmHg. Started him on amlodipine 5 mg daily. The patient will follow up in a couple of weeks to recheck his blood pressure.

## 2023-12-05 ENCOUNTER — OFFICE VISIT (OUTPATIENT)
Dept: FAMILY MEDICINE CLINIC | Facility: CLINIC | Age: 53
End: 2023-12-05
Payer: COMMERCIAL

## 2023-12-05 VITALS
HEART RATE: 98 BPM | HEIGHT: 71 IN | WEIGHT: 204 LBS | BODY MASS INDEX: 28.56 KG/M2 | DIASTOLIC BLOOD PRESSURE: 74 MMHG | SYSTOLIC BLOOD PRESSURE: 132 MMHG | OXYGEN SATURATION: 98 %

## 2023-12-05 DIAGNOSIS — E78.2 MIXED HYPERLIPIDEMIA: Primary | ICD-10-CM

## 2023-12-05 DIAGNOSIS — I10 BENIGN ESSENTIAL HYPERTENSION: ICD-10-CM

## 2023-12-05 DIAGNOSIS — H61.21 IMPACTED CERUMEN OF RIGHT EAR: ICD-10-CM

## 2023-12-05 PROCEDURE — 99214 OFFICE O/P EST MOD 30 MIN: CPT | Performed by: INTERNAL MEDICINE

## 2023-12-05 RX ORDER — ASCORBIC ACID 500 MG
1500 TABLET ORAL DAILY
Qty: 90 TABLET | Refills: 3 | Status: SHIPPED | OUTPATIENT
Start: 2023-12-05

## 2023-12-05 NOTE — PROGRESS NOTES
Assessment/Plan:    Problem List Items Addressed This Visit        Cardiovascular and Mediastinum    Benign essential hypertension       Other    Mixed hyperlipidemia - Primary    Relevant Orders    Basic metabolic panel    Lipid panel    Hepatic function panel   Other Visit Diagnoses     Impacted cerumen of right ear              Hypertension. His blood pressure is better today. Cerumen impaction, right ear. Cerumen is from his right ear today. He was advised to continue using Debrox. Follow-up  The patient will follow up in 6 months. Chief Complaint    Follow-up; Care Gap Request         Patient ID: Marcella Carvalho is a 46 y.o. male who returns for routine follow up. Debora Louis is a 41-year-old male who presents for a follow-up visit. The patient has intermittently utilized Debrox for cerumen removal. His hearing has been affected. He thought he was getting an ear infection in his left ear. He reports improvement in his mental clarity, with a reduction in the sensation of cognitive fog; but it depends on the pressure    He expresses interest in shingles vaccine and would like to do it in 2024. He had his COVID-19 vaccine     Objective:    /74 (BP Location: Left arm, Patient Position: Sitting, Cuff Size: Large)   Pulse 98   Ht 5' 11" (1.803 m)   Wt 92.5 kg (204 lb)   SpO2 98%   BMI 28.45 kg/m²     Wt Readings from Last 3 Encounters:   12/05/23 92.5 kg (204 lb)   11/21/23 90.9 kg (200 lb 6.4 oz)   12/07/21 92.5 kg (204 lb)         Physical Exam    General:  Well-developed, well-nourished, in no acute distress. HEENT: There is still a cerumen impaction on the right ear, although the cerumen looks soft. I attempted to remove the cerumen with a curette but he was very uncomfortable and did have a scant amount of bleeding in the external canal which was controlled with an tip applicator. I did put some gauze in his ear to prevent oozing.     His cholesterol level is 160 mg/dL and LDL is 95 mg/dL, vitamin D is normal.    Transcribed for Dave Hammond MD, by Sandrine Duke on 12/06/23 at 4:23 PM. Powered by [employer].

## 2023-12-20 DIAGNOSIS — K22.2 STRICTURE OF ESOPHAGUS: ICD-10-CM

## 2023-12-20 RX ORDER — OMEPRAZOLE 40 MG/1
CAPSULE, DELAYED RELEASE ORAL
Qty: 30 CAPSULE | Refills: 3 | Status: SHIPPED | OUTPATIENT
Start: 2023-12-20

## 2024-02-19 ENCOUNTER — APPOINTMENT (OUTPATIENT)
Dept: LAB | Facility: CLINIC | Age: 54
End: 2024-02-19
Payer: COMMERCIAL

## 2024-02-19 ENCOUNTER — OFFICE VISIT (OUTPATIENT)
Dept: FAMILY MEDICINE CLINIC | Facility: CLINIC | Age: 54
End: 2024-02-19
Payer: COMMERCIAL

## 2024-02-19 ENCOUNTER — APPOINTMENT (OUTPATIENT)
Dept: RADIOLOGY | Facility: CLINIC | Age: 54
End: 2024-02-19
Payer: COMMERCIAL

## 2024-02-19 ENCOUNTER — PATIENT MESSAGE (OUTPATIENT)
Dept: FAMILY MEDICINE CLINIC | Facility: CLINIC | Age: 54
End: 2024-02-19

## 2024-02-19 VITALS
WEIGHT: 202 LBS | HEART RATE: 75 BPM | BODY MASS INDEX: 28.28 KG/M2 | OXYGEN SATURATION: 98 % | DIASTOLIC BLOOD PRESSURE: 78 MMHG | SYSTOLIC BLOOD PRESSURE: 138 MMHG | HEIGHT: 71 IN

## 2024-02-19 DIAGNOSIS — M25.541 ARTHRALGIA OF HANDS, BILATERAL: Primary | ICD-10-CM

## 2024-02-19 DIAGNOSIS — M25.541 ARTHRALGIA OF HANDS, BILATERAL: ICD-10-CM

## 2024-02-19 DIAGNOSIS — R76.8 POSITIVE ANA (ANTINUCLEAR ANTIBODY): ICD-10-CM

## 2024-02-19 DIAGNOSIS — M25.542 ARTHRALGIA OF HANDS, BILATERAL: ICD-10-CM

## 2024-02-19 DIAGNOSIS — M25.542 ARTHRALGIA OF HANDS, BILATERAL: Primary | ICD-10-CM

## 2024-02-19 DIAGNOSIS — I10 BENIGN ESSENTIAL HYPERTENSION: ICD-10-CM

## 2024-02-19 LAB — CRP SERPL QL: 2 MG/L

## 2024-02-19 PROCEDURE — 86039 ANTINUCLEAR ANTIBODIES (ANA): CPT

## 2024-02-19 PROCEDURE — 86038 ANTINUCLEAR ANTIBODIES: CPT

## 2024-02-19 PROCEDURE — 86140 C-REACTIVE PROTEIN: CPT

## 2024-02-19 PROCEDURE — 36415 COLL VENOUS BLD VENIPUNCTURE: CPT

## 2024-02-19 PROCEDURE — 99214 OFFICE O/P EST MOD 30 MIN: CPT | Performed by: INTERNAL MEDICINE

## 2024-02-19 PROCEDURE — 73130 X-RAY EXAM OF HAND: CPT

## 2024-02-19 PROCEDURE — 86430 RHEUMATOID FACTOR TEST QUAL: CPT

## 2024-02-19 NOTE — PROGRESS NOTES
Name: Cam Tomlinson      : 1970      MRN: [id number]  Encounter Provider: Jd Moyer MD  Encounter Date: 2024   Encounter department: [facility] PRIMARY CARE    Assessment & Plan     Assessment & Plan  1. Bilateral hand pain.  His hands are painful, especially with hand gripping. RA could be a possibility. I will get x-rays of his hands. I will also check an autoimmune test to check for rheumatoid arthritis, lupus, and C-reactive protein. If nothing comes out, we will have him see a rheumatologist.    2. Bilateral lower extremity edema.  Amlodipine is the main side effect of swelling. The amount of swelling in his legs today seems to be pretty mild. He will continue amlodipine 5 mg daily. He will send me a [employer] message if the swelling is persisting.     Follow-up  The patient will follow up as needed.     No orders of the defined types were placed in this encounter.      Subjective      History of Present Illness  The patient is a 53-year-old male who presents for evaluation of multiple medical concerns.    He was seeing Dr. Montoya, chiropractor, for his hands. He put it off for about 3 or 4 weeks because he had the chili event, so he wanted to wait until after the event. Shortly after the event, he was on his feet, and his legs and ankles looked really swollen. When he saw Dr. Odom, it was not as bad, but there was some pitting. He was told to see his family doctor. He noticed many weeks ago, around the areas of the knuckles, the first MCPs, all of a sudden, they were stiff. At times, depending on how he opens the car or the angle, it hurts. He noticed it before the chili event. As the time went on, he thought it was due to stress, but it kept getting worse. Sometimes, when he makes a fist or fist bumps, depending on how he moves his hands, the pain and stiffness radiate. It was not just one hand. Dr. Odom thought it was his fingers. The swelling in his legs started before the  "actual event on 02/10/2024. He has noticed it periodically if he is on his feet a lot. The other night, his ankles looked bigger. It eventually dissipated. He is on amlodipine 5 mg daily for his blood pressure, which was started in 11/2023. He denies eating a lot of salty food around the time of the chili event. He does not think his diet changed.   His daughter has lupus.  Review of Systems      Objective     /78 (BP Location: Left arm, Patient Position: Sitting, Cuff Size: Large)   Pulse 75   Ht 5' 11\" (1.803 m)   Wt 91.6 kg (202 lb)   SpO2 98%   BMI 28.17 kg/m²     Physical Exam  Musculoskeletal:There is no evidence of synovitis on the MCPs or PIPs bilaterally, but he does have some discomfort with hand . Mild tenderness on the medial tibia bilaterally. Trace swelling.  Physical Exam      "

## 2024-02-20 LAB
ANA HOMOGEN SER QL IF: NORMAL
ANA HOMOGEN TITR SER: NORMAL {TITER}
ANA SER QL IA: POSITIVE
RHEUMATOID FACT SER QL LA: NEGATIVE

## 2024-02-29 ENCOUNTER — TELEPHONE (OUTPATIENT)
Dept: FAMILY MEDICINE CLINIC | Facility: CLINIC | Age: 54
End: 2024-02-29

## 2024-02-29 DIAGNOSIS — M79.641 BILATERAL HAND PAIN: Primary | ICD-10-CM

## 2024-02-29 DIAGNOSIS — M79.642 BILATERAL HAND PAIN: Primary | ICD-10-CM

## 2024-02-29 NOTE — TELEPHONE ENCOUNTER
----- Message from Robert Budinetz, MD sent at 2/29/2024  1:48 PM EST -----  Possible lupus on labs  Will place rheumatology referral

## 2024-02-29 NOTE — TELEPHONE ENCOUNTER
Pt asking if he should restart his bp medication or hold off until he sees rheumatologist? Please advise.

## 2024-03-01 DIAGNOSIS — G47.19 EXCESSIVE DAYTIME SLEEPINESS: Primary | ICD-10-CM

## 2024-03-04 ENCOUNTER — TELEPHONE (OUTPATIENT)
Age: 54
End: 2024-03-04

## 2024-03-04 NOTE — TELEPHONE ENCOUNTER
Patient called the office because he was a returning a missed call from 2/29/24. Patient was notified to restart his amlodipine. Patient was aware because he has been sending [employer] messages regarding his medication and also his symptoms.

## 2024-03-09 ENCOUNTER — TELEPHONE (OUTPATIENT)
Dept: SLEEP CENTER | Facility: CLINIC | Age: 54
End: 2024-03-09

## 2024-03-09 DIAGNOSIS — G47.19 EXCESSIVE DAYTIME SLEEPINESS: Primary | ICD-10-CM

## 2024-03-09 NOTE — TELEPHONE ENCOUNTER
Referral placed for a home sleep study.  Patient does not have enough qualifying symptoms for a sleep study. Patient will need to be evaluated with a consultation.     Please place new referral for sleep consultation using [id number].      Ordering and co-signing providers notified.

## 2024-03-11 NOTE — ADDENDUM NOTE
Addended by: BUDINETZ, ROBERT on: 3/11/2024 08:51 AM     Modules accepted: Orders     Problem: Potential for Falls  Goal: Patient will remain free of falls  Description  INTERVENTIONS:  - Assess patient frequently for physical needs  -  Identify cognitive and physical deficits and behaviors that affect risk of falls    -  [place] fall precautions as indicated by assessment   - Educate patient/family on patient safety including physical limitations  - Instruct patient to call for assistance with activity based on assessment  - Modify environment to reduce risk of injury  - Consider OT/PT consult to assist with strengthening/mobility  Outcome: Progressing     Problem: METABOLIC, FLUID AND ELECTROLYTES - ADULT  Goal: Glucose maintained within target range  Description  INTERVENTIONS:  - Monitor Blood Glucose as ordered  - Assess for signs and symptoms of hyperglycemia and hypoglycemia  - Administer ordered medications to maintain glucose within target range  - Assess nutritional intake and initiate nutrition service referral as needed  Outcome: Progressing     Problem: MUSCULOSKELETAL - ADULT  Goal: Maintain or return mobility to safest level of function  Description  INTERVENTIONS:  - Assess patient's ability to carry out ADLs; assess patient's baseline for ADL function and identify physical deficits which impact ability to perform ADLs (bathing, care of mouth/teeth, toileting, grooming, dressing, etc )  - Assess/evaluate cause of self-care deficits   - Assess range of motion  - Assess patient's mobility  - Assess patient's need for assistive devices and provide as appropriate  - Encourage maximum independence but intervene and supervise when necessary  - Involve family in performance of ADLs  - Assess for home care needs following discharge   - Consider OT consult to assist with ADL evaluation and planning for discharge  - Provide patient education as appropriate  Outcome: Progressing     Problem: PAIN - ADULT  Goal: Verbalizes/displays adequate comfort level or baseline comfort level  Description  Interventions:  - Encourage patient to monitor pain and request assistance  - Assess pain using appropriate pain scale  - Administer analgesics based on type and severity of pain and evaluate response  - Implement non-pharmacological measures as appropriate and evaluate response  - Consider cultural and social influences on pain and pain management  - Notify physician/advanced practitioner if interventions unsuccessful or patient reports new pain  Outcome: Progressing     Problem: DISCHARGE PLANNING  Goal: Discharge to home or other facility with appropriate resources  Description  INTERVENTIONS:  - Identify barriers to discharge w/patient and caregiver  - Arrange for needed discharge resources and transportation as appropriate  - Identify discharge learning needs (meds, wound care, etc )  - Arrange for interpretive services to assist at discharge as needed  - Refer to Case Management Department for coordinating discharge planning if the patient needs post-hospital services based on physician/advanced practitioner order or complex needs related to functional status, cognitive ability, or social support system  Outcome: Progressing     Problem: Knowledge Deficit  Goal: Patient/family/caregiver demonstrates understanding of disease process, treatment plan, medications, and discharge instructions  Description  Complete learning assessment and assess knowledge base    Interventions:  - Provide teaching at level of understanding  - Provide teaching via preferred learning methods  Outcome: Progressing

## 2024-03-15 DIAGNOSIS — G47.19 EXCESSIVE DAYTIME SLEEPINESS: Primary | ICD-10-CM

## 2024-03-20 DIAGNOSIS — N52.9 VASCULOGENIC ERECTILE DYSFUNCTION, UNSPECIFIED VASCULOGENIC ERECTILE DYSFUNCTION TYPE: ICD-10-CM

## 2024-03-20 RX ORDER — TADALAFIL 10 MG/1
20 TABLET ORAL DAILY PRN
Qty: 10 TABLET | Refills: 3 | Status: SHIPPED | OUTPATIENT
Start: 2024-03-20

## 2024-05-29 DIAGNOSIS — I10 BENIGN ESSENTIAL HYPERTENSION: ICD-10-CM

## 2024-05-29 RX ORDER — AMLODIPINE BESYLATE 5 MG/1
5 TABLET ORAL DAILY
Qty: 30 TABLET | Refills: 5 | Status: SHIPPED | OUTPATIENT
Start: 2024-05-29

## 2024-06-04 ENCOUNTER — RA CDI HCC (OUTPATIENT)
Dept: OTHER | Facility: HOSPITAL | Age: 54
End: 2024-06-04

## 2024-06-10 ENCOUNTER — OFFICE VISIT (OUTPATIENT)
Dept: FAMILY MEDICINE CLINIC | Facility: CLINIC | Age: 54
End: 2024-06-10
Payer: COMMERCIAL

## 2024-06-10 VITALS
DIASTOLIC BLOOD PRESSURE: 70 MMHG | HEART RATE: 65 BPM | HEIGHT: 71 IN | SYSTOLIC BLOOD PRESSURE: 130 MMHG | WEIGHT: 206.2 LBS | OXYGEN SATURATION: 97 % | BODY MASS INDEX: 28.87 KG/M2

## 2024-06-10 DIAGNOSIS — M25.542 ARTHRALGIA OF HANDS, BILATERAL: ICD-10-CM

## 2024-06-10 DIAGNOSIS — E78.2 MIXED HYPERLIPIDEMIA: ICD-10-CM

## 2024-06-10 DIAGNOSIS — I10 BENIGN ESSENTIAL HYPERTENSION: Primary | ICD-10-CM

## 2024-06-10 DIAGNOSIS — M25.541 ARTHRALGIA OF HANDS, BILATERAL: ICD-10-CM

## 2024-06-10 DIAGNOSIS — D12.6 TUBULOVILLOUS ADENOMA POLYP OF COLON: ICD-10-CM

## 2024-06-10 PROCEDURE — 99214 OFFICE O/P EST MOD 30 MIN: CPT | Performed by: INTERNAL MEDICINE

## 2024-06-10 NOTE — ASSESSMENT & PLAN NOTE
He'll be due for a follow-up colonoscopy by 2025.  He is going to check in with his gastroenterologist.

## 2024-06-10 NOTE — ASSESSMENT & PLAN NOTE
He had a low titer positive SOREN but his double-stranded DNA was positive.  He did see the rheumatologist, Dr. Daniel Hebert in [place] who recommended nonsteroidal anti-inflammatory drugs and some follow-up tests.  He really hasn't had any more problems with arthralgias and he is going to hold off on going back to see the rheumatologist for now.  He knows that he can go back and see the rheumatologist if he does develop some pain and swelling in his hands again.

## 2024-06-10 NOTE — PROGRESS NOTES
Ambulatory Visit  Name: Cam Tomlinson      : 1970      MRN: [id number]  Encounter Provider: Jd Moyer MD  Encounter Date: 6/10/2024   Encounter department: [facility] PRIMARY CARE    Assessment & Plan  Benign essential hypertension  His blood pressure was normal on repeat.  Continue amlodipine.  Arthralgia of hands, bilateral  He had a low titer positive SOREN but his double-stranded DNA was positive.  He did see the rheumatologist, Dr. Daniel Hebert in [place] who recommended nonsteroidal anti-inflammatory drugs and some follow-up tests.  He really hasn't had any more problems with arthralgias and he is going to hold off on going back to see the rheumatologist for now.  He knows that he can go back and see the rheumatologist if he does develop some pain and swelling in his hands again.  Tubulovillous adenoma polyp of colon  He'll be due for a follow-up colonoscopy by .  He is going to check in with his gastroenterologist.  Mixed hyperlipidemia  Excellent LDL on atorvastatin.          History of Present Illness     History of Present Illness  The patient presents to the office for follow-up of multiple medical concerns.    The patient reports an improvement in his hand condition, despite not attending the follow-up with the rheumatologist. He acknowledges a slight elevation in his creatinine levels, which he attributes to inadequate water intake prior to his recent blood work. The rheumatologist who suggested a follow-up and increased ibuprofen dosage. However, the stiffness ceased after taking ibuprofen.     The patient's cholesterol levels are well-managed with Lipitor. However, he has been unable to lose weight and plans to incorporate walking into his routine.    Supplemental Information  He has not been taking vitamin D for a month because he needs to order it for. He still has loose bowel movements. He thinks he is due for another colonoscopy soon. He thinks he might have a little  "diverticulitis. He has to make sure he is going to the bathroom because sometimes depending on what he eats, he can go without going to the bathroom for a while and have a normal bowel movement. He has a history of kidney stones.     Review of Systems  Objective     /70 (BP Location: Left arm, Cuff Size: Large)   Pulse 65   Ht 5' 11\" (1.803 m)   Wt 93.5 kg (206 lb 3.2 oz)   SpO2 97%   BMI 28.76 kg/m²     Physical Exam    Physical Exam  Constitutional:       General: He is not in acute distress.     Appearance: Normal appearance. He is well-developed. He is not ill-appearing.   Neck:      Vascular: No JVD.   Cardiovascular:      Rate and Rhythm: Normal rate and regular rhythm.      Heart sounds: Normal heart sounds. No murmur heard.     No friction rub. No gallop.   Pulmonary:      Breath sounds: Normal breath sounds.   Abdominal:      General: Bowel sounds are normal. There is no distension.      Palpations: Abdomen is soft. There is no mass.   Musculoskeletal:         General: No swelling.      Comments: No evidence of synovitis on his hands or wrists.   Neurological:      Mental Status: He is alert.       Administrative Statements         "

## 2024-06-20 ENCOUNTER — NURSE TRIAGE (OUTPATIENT)
Age: 54
End: 2024-06-20

## 2024-06-20 NOTE — TELEPHONE ENCOUNTER
Regarding: Rt Arm Weakness/Pale  ----- Message from April M sent at 6/20/2024  3:59 PM EDT -----  Patient called and stated that yesterday his rt arm and hand has become weak.  He had a hard time opening a slim lee.  He has bene told he looks pale as well.  Pleas call and triage.

## 2024-06-20 NOTE — TELEPHONE ENCOUNTER
"Patient states yesterday he started having weakness in his right arm down to his hand, denies any other symptoms. States today is worse, advised patient needs to be seen today, either in the office or at an . Patient states [facility] closer he will go there now and f/u with office after.        Reason for Disposition   Neurologic deficit of gradual onset (e.g., days to weeks), ANY of the following: * Weakness of the face, arm, or leg on one side of the body* Numbness of the face, arm, or leg on one side of the body* Loss of speech or garbled speech    Additional Information   Weakness of the face, arm or leg on one side of the body    Answer Assessment - Initial Assessment Questions  1. SYMPTOM: \"What is the main symptom you are concerned about?\" (e.g., weakness, numbness)      Weakness in right arm to hand  2. ONSET: \"When did this start?\" (minutes, hours, days; while sleeping)      yesterday  3. LAST NORMAL: \"When was the last time you were normal (no symptoms)?\"      2 days ago  4. PATTERN \"Does this come and go, or has it been constant since it started?\"  \"Is it present now?\"      constant  5. CARDIAC SYMPTOMS: \"Have you had any of the following symptoms: chest pain, difficulty breathing, palpitations?\"      denies  6. NEUROLOGIC SYMPTOMS: \"Have you had any of the following symptoms: headache, dizziness, vision loss, double vision, changes in speech, unsteady on your feet?\"      denies  7. OTHER SYMPTOMS: \"Do you have any other symptoms?\"      denies    Protocols used: Weakness (Generalized) and Fatigue-ADULT-[place], Neurologic Deficit-ADULT-[place]    "

## 2024-07-05 DIAGNOSIS — E78.2 MIXED HYPERLIPIDEMIA: ICD-10-CM

## 2024-07-05 DIAGNOSIS — K22.2 STRICTURE OF ESOPHAGUS: ICD-10-CM

## 2024-07-05 RX ORDER — OMEPRAZOLE 40 MG/1
CAPSULE, DELAYED RELEASE ORAL
Qty: 30 CAPSULE | Refills: 3 | Status: SHIPPED | OUTPATIENT
Start: 2024-07-05

## 2024-07-06 RX ORDER — ATORVASTATIN CALCIUM 40 MG/1
40 TABLET, FILM COATED ORAL DAILY
Qty: 100 TABLET | Refills: 1 | Status: SHIPPED | OUTPATIENT
Start: 2024-07-06

## 2024-07-29 ENCOUNTER — OFFICE VISIT (OUTPATIENT)
Dept: FAMILY MEDICINE CLINIC | Facility: CLINIC | Age: 54
End: 2024-07-29
Payer: COMMERCIAL

## 2024-07-29 ENCOUNTER — TELEPHONE (OUTPATIENT)
Age: 54
End: 2024-07-29

## 2024-07-29 VITALS
DIASTOLIC BLOOD PRESSURE: 82 MMHG | SYSTOLIC BLOOD PRESSURE: 138 MMHG | WEIGHT: 205 LBS | TEMPERATURE: 99.5 F | HEART RATE: 56 BPM | BODY MASS INDEX: 28.7 KG/M2 | HEIGHT: 71 IN | OXYGEN SATURATION: 96 %

## 2024-07-29 DIAGNOSIS — U07.1 COVID-19: Primary | ICD-10-CM

## 2024-07-29 DIAGNOSIS — R60.0 BILATERAL LOWER EXTREMITY EDEMA: ICD-10-CM

## 2024-07-29 LAB
SARS-COV-2 AG UPPER RESP QL IA: POSITIVE
VALID CONTROL: ABNORMAL

## 2024-07-29 PROCEDURE — 87811 SARS-COV-2 COVID19 W/OPTIC: CPT | Performed by: INTERNAL MEDICINE

## 2024-07-29 PROCEDURE — 99214 OFFICE O/P EST MOD 30 MIN: CPT | Performed by: INTERNAL MEDICINE

## 2024-07-29 RX ORDER — NIRMATRELVIR AND RITONAVIR 300-100 MG
3 KIT ORAL 2 TIMES DAILY
Qty: 30 TABLET | Refills: 0 | Status: SHIPPED | OUTPATIENT
Start: 2024-07-29 | End: 2024-07-29

## 2024-07-29 RX ORDER — NIRMATRELVIR AND RITONAVIR 300-100 MG
3 KIT ORAL 2 TIMES DAILY
Qty: 30 TABLET | Refills: 0 | Status: SHIPPED | OUTPATIENT
Start: 2024-07-29 | End: 2024-08-03

## 2024-07-29 NOTE — TELEPHONE ENCOUNTER
Patient called asking if he can take Tylenol and Motrin while he is taking Paxlovid.  Advised that Tylenol is safe to take but to avoid Ibuprofen while taking Paxlovid due to drug interaction.  Understanding verbalized.

## 2024-07-29 NOTE — PROGRESS NOTES
Ambulatory Visit  Name: Cam Tomlinson      : 1970      MRN: [id number]  Encounter Provider: Jd Moyer MD  Encounter Date: 2024   Encounter department: [facility] PRIMARY CARE    Assessment & Plan  COVID-19  His COVID rapid antigen test is positive.  The petechial rash could be from COVID.  Recommended Paxlovid for 5 days. Discussed metallic taste side effect.  He is going to hold atorvastatin for the next 5 days.  He was advised to isolate for 5 days and then he may use mask to go out in public for 5 days after that.  Bilateral lower extremity edema  I suspect this is secondary to a combination of the amlodipine, prolonged standing for a few days when he was out in [place], and that he likely ate more salt than normal.  He is going to hold the amlodipine for a few days and let me know how he does.  He is already started to improve since coming home.              History of Present Illness       History of Present Illness  The patient is a 54-year-old male who presents for evaluation of leg swelling.    The patient reports experiencing leg swelling, which he attributes to prolonged periods of standing and walking at a car show. He describes a sensation of pressure in both legs, reminiscent of physical exertion. He attempted to alleviate the discomfort by elevating his legs, but refrained from applying ice. The onset of these symptoms was Wednesday. He also reports nocturia, necessitating three nocturnal awakenings to urinate. Additionally, he reports feeling exhausted and falling asleep while watching an ample theater on Saturday. Upon returning home, he noticed a rash, characterized by redness but no bumps, which were not itchy. Despite these symptoms, he was able to consume food without difficulty, but experienced significant mucus production. His temperature was recorded at 100.9 last night, which was alleviated by Tylenol. However, his temperature was 99 in the middle of the  "night, and he did not take his temperature this morning. He also reports feeling cold, nasal congestion, and a productive cough with clear sputum. He continues his allergy medication, but has not taken Benadryl. He has not undergone a COVID-19 test.        Objective     /82 (BP Location: Right arm, Patient Position: Sitting, Cuff Size: Large)   Pulse 56   Temp 99.5 °F (37.5 °C)   Ht 5' 11\" (1.803 m)   Wt 93 kg (205 lb)   SpO2 96%   BMI 28.59 kg/m²     Physical Exam    Physical Exam  Constitutional:       General: He is not in acute distress.     Appearance: Normal appearance. He is not ill-appearing.      Comments: He sounds congested.   Pulmonary:      Effort: Pulmonary effort is normal.      Breath sounds: Normal breath sounds.   Musculoskeletal:         General: Swelling (1+ bilateral legs about half the way up) present.   Skin:     Comments: Petechial rash on the medial aspect of his distal legs bilaterally.   Neurological:      Mental Status: He is alert.       Administrative Statements       "

## 2024-07-29 NOTE — TELEPHONE ENCOUNTER
Patient called the office asking if he still can take Tylenol and motrin with Paxlovid. Call was warm transferred to nurse triage for assistance.

## 2024-08-15 ENCOUNTER — TELEPHONE (OUTPATIENT)
Age: 54
End: 2024-08-15

## 2024-08-15 NOTE — TELEPHONE ENCOUNTER
Patient was asked to stop the BP and the cholesterol meds when he was taking paxlovid.  Should he start taking them again.  Pls advise .

## 2024-12-02 ENCOUNTER — TELEPHONE (OUTPATIENT)
Age: 54
End: 2024-12-02

## 2024-12-02 DIAGNOSIS — E55.9 VITAMIN D DEFICIENCY: ICD-10-CM

## 2024-12-02 DIAGNOSIS — E78.2 MIXED HYPERLIPIDEMIA: Primary | ICD-10-CM

## 2024-12-02 NOTE — TELEPHONE ENCOUNTER
I ordered his labs.  He can print these out from [facility] if he is going to go to [facility] to get his blood work done.

## 2024-12-02 NOTE — TELEPHONE ENCOUNTER
Patient called stating that normally before his appointments, he has labs to complete every 6 months and he did not see  scripts in his chart. I advised it looks like last labs completed was on 6/7/24 and there were no outstanding labs in his chart. He would like a Mychart message or phone call verifying that he does not have any outstanding labs. He would also like a script to get Vitamin D levels checked.

## 2024-12-06 ENCOUNTER — RESULTS FOLLOW-UP (OUTPATIENT)
Dept: FAMILY MEDICINE CLINIC | Facility: CLINIC | Age: 54
End: 2024-12-06

## 2024-12-09 ENCOUNTER — OFFICE VISIT (OUTPATIENT)
Dept: FAMILY MEDICINE CLINIC | Facility: CLINIC | Age: 54
End: 2024-12-09
Payer: COMMERCIAL

## 2024-12-09 VITALS
OXYGEN SATURATION: 97 % | BODY MASS INDEX: 28.87 KG/M2 | WEIGHT: 206.2 LBS | SYSTOLIC BLOOD PRESSURE: 128 MMHG | DIASTOLIC BLOOD PRESSURE: 88 MMHG | HEART RATE: 78 BPM | HEIGHT: 71 IN

## 2024-12-09 DIAGNOSIS — I10 BENIGN ESSENTIAL HYPERTENSION: Primary | ICD-10-CM

## 2024-12-09 DIAGNOSIS — E78.2 MIXED HYPERLIPIDEMIA: ICD-10-CM

## 2024-12-09 DIAGNOSIS — D12.6 TUBULOVILLOUS ADENOMA POLYP OF COLON: ICD-10-CM

## 2024-12-09 DIAGNOSIS — E55.9 VITAMIN D DEFICIENCY: ICD-10-CM

## 2024-12-09 PROCEDURE — 99214 OFFICE O/P EST MOD 30 MIN: CPT | Performed by: INTERNAL MEDICINE

## 2024-12-09 RX ORDER — AMLODIPINE BESYLATE 5 MG/1
5 TABLET ORAL DAILY
Qty: 30 TABLET | Refills: 5 | Status: SHIPPED | OUTPATIENT
Start: 2024-12-09

## 2024-12-09 NOTE — PROGRESS NOTES
Name: Cam Tomlinson      : 1970      MRN: [id number]  Encounter Provider: Jd Moyer MD  Encounter Date: 2024   Encounter department: [facility] PRIMARY CARE    Assessment & Plan  Health Maintenance  His electrolytes, blood sugar, liver tests, and creatinine are all within normal ranges. His lipid profile is satisfactory, although a slight reduction in LDL would be beneficial. His blood pressure is well managed with amlodipine 5 mg daily. He is advised to reduce his intake of fatty foods. He is eligible for a COVID-19 vaccine after 90 days. He received a free prostate and skin cancer screening, which indicated a potential melanoma. He is scheduled to have the rest of the atypical lesion removed on 2024, and a full-body scan in 2025.    Treatment plan: A prescription for amlodipine will be sent to [facility].      Tubulovillous adenoma polyp  His recent colonoscopy did not reveal any polyps.    Treatment plan: He is advised to continue his follow-up with the gastroenterologist.      Vitamin D deficiency  He has not been taking vitamin D supplements for some time.  Diagnostic plan: The results of his vitamin D levels will be reviewed once available.      Elevated calprotectin levels  His calprotectin levels were significantly elevated at 560. Despite the normal colonoscopy results, further evaluation may be necessary to determine the cause.  Treatment plan: He is advised to continue monitoring his symptoms and follow up with his gastroenterologist.      Medication Management  He is currently on atorvastatin 40 mg daily for cholesterol management and amlodipine 5 mg daily for blood pressure control.  Diagnostic plan: -  Treatment plan: A prescription for amlodipine will be sent to [facility].  Clinical decision making: -    Follow-up: - 6 months          History of Present Illness     History of Present Illness  The patient is a 54-year-old male who  "presents for evaluation of multiple medical concerns.    He recently underwent a free prostate and skin cancer screening. The screening revealed a slightly enlarged prostate but normal PSA levels. A plastic surgeon recommended a dermatology consultation due to potential melanoma on his scalp. A biopsy confirmed atypical cells, and he is scheduled for further removal on 12/23/2024. He also plans to have a full body check in 01/2025. 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    He has not been taking vitamin D supplements for some time and is curious about his current levels. His creatinine levels, previously high, have returned to normal. His cholesterol levels have slightly increased, which he attributes to a diet high in fatty foods. He occasionally experiences swelling but does not require any medication refills at this time.    He recently had a colonoscopy, which was not due until next year, and the results were normal. His calprotectin level was nearly 600, which he believes may be due to Crohn's disease. He has a history of gallbladder removal and uses MiraLAX and Gatorade for bowel regulation. His bowel movements have been regular recently. He was previously prescribed Questran by Dr. Grossman, but he did not take it as he felt it was unnecessary.           Objective   /88 (BP Location: Right arm, Patient Position: Sitting, Cuff Size: Standard)   Pulse 78   Ht 5' 11\" (1.803 m)   Wt 93.5 kg (206 lb 3.2 oz)   SpO2 97%   BMI 28.76 kg/m²     Physical Exam    Physical Exam  Constitutional:       General: He is not in acute distress.     Appearance: Normal appearance. He is well-developed. He is not ill-appearing.   Neck:      Vascular: No JVD.   Cardiovascular:      Rate and Rhythm: Normal rate and regular rhythm.      Heart sounds: Normal heart sounds. No murmur heard.     No friction rub. No gallop.   Pulmonary:      Breath sounds: Normal breath sounds.   Abdominal:      General: " Bowel sounds are normal. There is no distension.      Palpations: Abdomen is soft. There is no mass.   Musculoskeletal:         General: No swelling.   Neurological:      Mental Status: He is alert.

## 2024-12-23 DIAGNOSIS — K22.2 STRICTURE OF ESOPHAGUS: ICD-10-CM

## 2024-12-24 RX ORDER — OMEPRAZOLE 40 MG/1
40 CAPSULE, DELAYED RELEASE ORAL DAILY
Qty: 30 CAPSULE | Refills: 5 | Status: SHIPPED | OUTPATIENT
Start: 2024-12-24

## 2025-04-16 ENCOUNTER — PATIENT MESSAGE (OUTPATIENT)
Dept: FAMILY MEDICINE CLINIC | Facility: CLINIC | Age: 55
End: 2025-04-16

## 2025-04-21 ENCOUNTER — TELEPHONE (OUTPATIENT)
Age: 55
End: 2025-04-21

## 2025-04-21 NOTE — TELEPHONE ENCOUNTER
"Patient states he does not like that Dr. Moyer does not respond to his mychart messages. Explained the process to patient. He states he would like to speak to him regarding him leaving the practice and who he should \"move onto next\".   "

## 2025-04-25 DIAGNOSIS — E78.2 MIXED HYPERLIPIDEMIA: ICD-10-CM

## 2025-04-25 RX ORDER — ATORVASTATIN CALCIUM 40 MG/1
40 TABLET, FILM COATED ORAL DAILY
Qty: 100 TABLET | Refills: 0 | Status: SHIPPED | OUTPATIENT
Start: 2025-04-25

## 2025-04-28 ENCOUNTER — OFFICE VISIT (OUTPATIENT)
Dept: FAMILY MEDICINE CLINIC | Facility: CLINIC | Age: 55
End: 2025-04-28
Payer: COMMERCIAL

## 2025-04-28 ENCOUNTER — APPOINTMENT (OUTPATIENT)
Dept: LAB | Facility: CLINIC | Age: 55
End: 2025-04-28
Payer: COMMERCIAL

## 2025-04-28 VITALS
HEART RATE: 83 BPM | DIASTOLIC BLOOD PRESSURE: 82 MMHG | SYSTOLIC BLOOD PRESSURE: 142 MMHG | WEIGHT: 207.6 LBS | OXYGEN SATURATION: 98 % | HEIGHT: 71 IN | BODY MASS INDEX: 29.06 KG/M2

## 2025-04-28 DIAGNOSIS — Z00.00 ANNUAL PHYSICAL EXAM: Primary | ICD-10-CM

## 2025-04-28 DIAGNOSIS — R23.3 PETECHIAE: ICD-10-CM

## 2025-04-28 DIAGNOSIS — E78.2 MIXED HYPERLIPIDEMIA: ICD-10-CM

## 2025-04-28 DIAGNOSIS — E55.9 VITAMIN D DEFICIENCY: ICD-10-CM

## 2025-04-28 DIAGNOSIS — N52.9 VASCULOGENIC ERECTILE DYSFUNCTION, UNSPECIFIED VASCULOGENIC ERECTILE DYSFUNCTION TYPE: ICD-10-CM

## 2025-04-28 DIAGNOSIS — I10 BENIGN ESSENTIAL HYPERTENSION: ICD-10-CM

## 2025-04-28 PROCEDURE — 99214 OFFICE O/P EST MOD 30 MIN: CPT | Performed by: INTERNAL MEDICINE

## 2025-04-28 PROCEDURE — 80048 BASIC METABOLIC PNL TOTAL CA: CPT

## 2025-04-28 PROCEDURE — 36415 COLL VENOUS BLD VENIPUNCTURE: CPT

## 2025-04-28 PROCEDURE — 80076 HEPATIC FUNCTION PANEL: CPT

## 2025-04-28 PROCEDURE — 85025 COMPLETE CBC W/AUTO DIFF WBC: CPT

## 2025-04-28 PROCEDURE — 80061 LIPID PANEL: CPT

## 2025-04-28 PROCEDURE — 82306 VITAMIN D 25 HYDROXY: CPT

## 2025-04-28 PROCEDURE — 99396 PREV VISIT EST AGE 40-64: CPT | Performed by: INTERNAL MEDICINE

## 2025-04-28 RX ORDER — TADALAFIL 10 MG/1
20 TABLET ORAL DAILY PRN
Qty: 10 TABLET | Refills: 3 | Status: SHIPPED | OUTPATIENT
Start: 2025-04-28

## 2025-04-28 NOTE — ASSESSMENT & PLAN NOTE
Continue atorvastatin.  Check fasting lipid panel.  Orders:    Basic metabolic panel; Future    Lipid panel; Future    Hepatic function panel; Future

## 2025-04-28 NOTE — PATIENT INSTRUCTIONS
"Patient Education     Routine physical for adults   The Basics   Written by the doctors and editors at [facility]   What is a physical? -- A physical is a routine visit, or \"check-up,\" with your doctor. You might also hear it called a \"wellness visit\" or \"preventive visit.\"  During each visit, the doctor will:   Ask about your physical and mental health   Ask about your habits, behaviors, and lifestyle   Do an exam   Give you vaccines if needed   Talk to you about any medicines you take   Give advice about your health   Answer your questions  Getting regular check-ups is an important part of taking care of your health. It can help your doctor find and treat any problems you have. But it's also important for preventing health problems.  A routine physical is different from a \"sick visit.\" A sick visit is when you see a doctor because of a health concern or problem. Since physicals are scheduled ahead of time, you can think about what you want to ask the doctor.  How often should I get a physical? -- It depends on your age and health. In general, for people age 21 years and older:   If you are younger than 50 years, you might be able to get a physical every 3 years.   If you are 50 years or older, your doctor might recommend a physical every year.  If you have an ongoing health condition, like diabetes or high blood pressure, your doctor will probably want to see you more often.  What happens during a physical? -- In general, each visit will include:   Physical exam - The doctor or nurse will check your height, weight, heart rate, and blood pressure. They will also look at your eyes and ears. They will ask about how you are feeling and whether you have any symptoms that bother you.   Medicines - It's a good idea to bring a list of all the medicines you take to each doctor visit. Your doctor will talk to you about your medicines and answer any questions. Tell them if you are having any side effects that bother you. You " "should also tell them if you are having trouble paying for any of your medicines.   Habits and behaviors - This includes:   Your diet   Your exercise habits   Whether you smoke, drink alcohol, or use drugs   Whether you are sexually active   Whether you feel safe at home  Your doctor will talk to you about things you can do to improve your health and lower your risk of health problems. They will also offer help and support. For example, if you want to quit smoking, they can give you advice and might prescribe medicines. If you want to improve your diet or get more physical activity, they can help you with this, too.   Lab tests, if needed - The tests you get will depend on your age and situation. For example, your doctor might want to check your:   Cholesterol   Blood sugar   Iron level   Vaccines - The recommended vaccines will depend on your age, health, and what vaccines you already had. Vaccines are very important because they can prevent certain serious or deadly infections.   Discussion of screening - \"Screening\" means checking for diseases or other health problems before they cause symptoms. Your doctor can recommend screening based on your age, risk, and preferences. This might include tests to check for:   Cancer, such as breast, prostate, cervical, ovarian, colorectal, prostate, lung, or skin cancer   Sexually transmitted infections, such as chlamydia and gonorrhea   Mental health conditions like depression and anxiety  Your doctor will talk to you about the different types of screening tests. They can help you decide which screenings to have. They can also explain what the results might mean.   Answering questions - The physical is a good time to ask the doctor or nurse questions about your health. If needed, they can refer you to other doctors or specialists, too.  Adults older than 65 years often need other care, too. As you get older, your doctor will talk to you about:   How to prevent falling at " home   Hearing or vision tests   Memory testing   How to take your medicines safely   Making sure that you have the help and support you need at home  All topics are updated as new evidence becomes available and our peer review process is complete.  This topic retrieved from [employer] on: May 02, 2024.  Topic 697700 Version 1.0  Release: 32.4.3 - C32.122  © 2024 UpToDate, Inc. and/or its affiliates. All rights reserved.  Consumer Information Use and Disclaimer   Disclaimer: This generalized information is a limited summary of diagnosis, treatment, and/or medication information. It is not meant to be comprehensive and should be used as a tool to help the user understand and/or assess potential diagnostic and treatment options. It does NOT include all information about conditions, treatments, medications, side effects, or risks that may apply to a specific patient. It is not intended to be medical advice or a substitute for the medical advice, diagnosis, or treatment of a health care provider based on the health care provider's examination and assessment of a patient's specific and unique circumstances. Patients must speak with a health care provider for complete information about their health, medical questions, and treatment options, including any risks or benefits regarding use of medications. This information does not endorse any treatments or medications as safe, effective, or approved for treating a specific patient. UpToDate, Inc. and its affiliates disclaim any warranty or liability relating to this information or the use thereof.The use of this information is governed by the Terms of Use, available at https://www.woltersClimber.comuwer.com/en/know/clinical-effectiveness-terms. 2024© UpToDate, Inc. and its affiliates and/or licensors. All rights reserved.  Copyright   © 2024 UpToDate, Inc. and/or its affiliates. All rights reserved.

## 2025-04-28 NOTE — PROGRESS NOTES
Adult Annual Physical  Name: Cam Tomlinson      : 1970      MRN: [id number]  Encounter Provider: Jd Moyer MD  Encounter Date: 2025   Encounter department: [facility]    :  Assessment & Plan  Annual physical exam  Exam complete.       Mixed hyperlipidemia  Continue atorvastatin.  Check fasting lipid panel.  Orders:    Basic metabolic panel; Future    Lipid panel; Future    Hepatic function panel; Future    Vitamin D deficiency    Orders:    Vitamin D 25 hydroxy; Future    Benign essential hypertension  Blood pressure well-controlled on amlodipine.       Petechiae  - Spots on foot appear to be petechiae, potentially turning sanjay due to iron deposition.  - Order CBC to rule out low platelet count.  - Consider podiatrist referral if pain persists.    Orders:    CBC and differential; Future    Vasculogenic erectile dysfunction, unspecified vasculogenic erectile dysfunction type    Orders:    tadalafil (CIALIS) 10 MG tablet; Take 2 tablets (20 mg total) by mouth daily as needed for erectile dysfunction      Follow-Up  - Regular Follow-Ups with Dermatologist and Ophthalmologist.  Advised routine dental visits.      Immunizations:  - Immunizations due: Influenza         History of Present Illness       History of Present Illness  54-year-old male presents for physical exam.    Therapy sessions positively impact mental health. Fasting prior to visit, open to blood work post-consultation. Under care of dermatologist Dr. Susan Maldonado, who performed full body scan and removed skin lesions. Regular consultations with ophthalmologist Dr. Camacho for significant visual impairment; uses reading glasses and prescription glasses for night driving. Last dental visit approximately 2 years ago.    Reports persistent spots and pain on right foot, absent on left foot. Swelling in leg occurs when standing for extended periods.    Increased nocturia noted, even with minimal water intake. Went  "to bed at 3:00 AM, woke up at 6:30 AM to urinate.     Adult Annual Physical:  Patient presents for annual physical.     Diet and Physical Activity:  - Diet/Nutrition: no special diet.  - Exercise: no formal exercise.    General Health:  - Sleep: sleeps well and 4-6 hours of sleep on average.  - Hearing: normal hearing right ear, normal hearing left ear and normal hearing bilateral ears.  - Vision: no vision problems and wears glasses.  - Dental: no dental visits for > 1 year and brushes teeth once daily.     Health:  - History of STDs: no.   - Urinary symptoms: none.       Review of Systems      Objective   /82 (BP Location: Left arm, Patient Position: Sitting, Cuff Size: Large)   Pulse 83   Ht 5' 11\" (1.803 m)   Wt 94.2 kg (207 lb 9.6 oz)   SpO2 98%   BMI 28.95 kg/m²     Physical Exam  Vitals reviewed.   Constitutional:       General: He is not in acute distress.     Appearance: Normal appearance. He is well-developed. He is not ill-appearing.   HENT:      Head: Normocephalic and atraumatic.      Right Ear: Tympanic membrane and external ear normal.      Left Ear: Tympanic membrane and external ear normal.      Nose: Nose normal.      Mouth/Throat:      Mouth: Mucous membranes are moist.      Pharynx: Oropharynx is clear.   Eyes:      General: No scleral icterus.  Neck:      Thyroid: No thyromegaly.      Vascular: No JVD.   Cardiovascular:      Rate and Rhythm: Normal rate and regular rhythm.      Heart sounds: Normal heart sounds. No murmur heard.     No friction rub. No gallop.   Pulmonary:      Breath sounds: Normal breath sounds.   Abdominal:      General: Bowel sounds are normal. There is no distension.      Palpations: Abdomen is soft. There is no mass.   Musculoskeletal:         General: No swelling.   Skin:     Comments: Scattered distal dorsum of left foot.   Neurological:      Mental Status: He is alert.   Psychiatric:         Mood and Affect: Mood normal.         "

## 2025-04-29 ENCOUNTER — TELEPHONE (OUTPATIENT)
Age: 55
End: 2025-04-29

## 2025-04-29 LAB
25(OH)D3 SERPL-MCNC: 24.1 NG/ML (ref 30–100)
ALBUMIN SERPL BCG-MCNC: 4.6 G/DL (ref 3.5–5)
ALP SERPL-CCNC: 56 U/L (ref 34–104)
ALT SERPL W P-5'-P-CCNC: 21 U/L (ref 7–52)
ANION GAP SERPL CALCULATED.3IONS-SCNC: 9 MMOL/L (ref 4–13)
AST SERPL W P-5'-P-CCNC: 17 U/L (ref 13–39)
BASOPHILS # BLD AUTO: 0.04 THOUSANDS/ÂΜL (ref 0–0.1)
BASOPHILS NFR BLD AUTO: 1 % (ref 0–1)
BILIRUB DIRECT SERPL-MCNC: 0.06 MG/DL (ref 0–0.2)
BILIRUB SERPL-MCNC: 0.57 MG/DL (ref 0.2–1)
BUN SERPL-MCNC: 16 MG/DL (ref 5–25)
CALCIUM SERPL-MCNC: 9.6 MG/DL (ref 8.4–10.2)
CHLORIDE SERPL-SCNC: 102 MMOL/L (ref 96–108)
CHOLEST SERPL-MCNC: 202 MG/DL (ref ?–200)
CO2 SERPL-SCNC: 29 MMOL/L (ref 21–32)
CREAT SERPL-MCNC: 0.98 MG/DL (ref 0.6–1.3)
EOSINOPHIL # BLD AUTO: 0.04 THOUSAND/ÂΜL (ref 0–0.61)
EOSINOPHIL NFR BLD AUTO: 1 % (ref 0–6)
ERYTHROCYTE [DISTWIDTH] IN BLOOD BY AUTOMATED COUNT: 12.4 % (ref 11.6–15.1)
GFR SERPL CREATININE-BSD FRML MDRD: 86 ML/MIN/1.73SQ M
GLUCOSE P FAST SERPL-MCNC: 90 MG/DL (ref 65–99)
HCT VFR BLD AUTO: 45.6 % (ref 36.5–49.3)
HDLC SERPL-MCNC: 49 MG/DL
HGB BLD-MCNC: 15.6 G/DL (ref 12–17)
IMM GRANULOCYTES # BLD AUTO: 0.02 THOUSAND/UL (ref 0–0.2)
IMM GRANULOCYTES NFR BLD AUTO: 0 % (ref 0–2)
LDLC SERPL CALC-MCNC: 124 MG/DL (ref 0–100)
LYMPHOCYTES # BLD AUTO: 1.74 THOUSANDS/ÂΜL (ref 0.6–4.47)
LYMPHOCYTES NFR BLD AUTO: 32 % (ref 14–44)
MCH RBC QN AUTO: 31.1 PG (ref 26.8–34.3)
MCHC RBC AUTO-ENTMCNC: 34.2 G/DL (ref 31.4–37.4)
MCV RBC AUTO: 91 FL (ref 82–98)
MONOCYTES # BLD AUTO: 0.45 THOUSAND/ÂΜL (ref 0.17–1.22)
MONOCYTES NFR BLD AUTO: 8 % (ref 4–12)
NEUTROPHILS # BLD AUTO: 3.2 THOUSANDS/ÂΜL (ref 1.85–7.62)
NEUTS SEG NFR BLD AUTO: 58 % (ref 43–75)
NONHDLC SERPL-MCNC: 153 MG/DL
NRBC BLD AUTO-RTO: 0 /100 WBCS
PLATELET # BLD AUTO: 233 THOUSANDS/UL (ref 149–390)
PMV BLD AUTO: 10.3 FL (ref 8.9–12.7)
POTASSIUM SERPL-SCNC: 4.3 MMOL/L (ref 3.5–5.3)
PROT SERPL-MCNC: 7.4 G/DL (ref 6.4–8.4)
RBC # BLD AUTO: 5.01 MILLION/UL (ref 3.88–5.62)
SODIUM SERPL-SCNC: 140 MMOL/L (ref 135–147)
TRIGL SERPL-MCNC: 146 MG/DL (ref ?–150)
WBC # BLD AUTO: 5.49 THOUSAND/UL (ref 4.31–10.16)

## 2025-04-29 NOTE — TELEPHONE ENCOUNTER
PA for tadalafil (CIALIS) 10 MG tablet  SUBMITTED to [facility] [place]    via    []CMM-KEY:   [x]Surescripts-Case ID #   []Availity-Auth ID # NDC #   []Faxed to plan   []Other website   []Phone call Case ID #     [x]PA sent as URGENT    All office notes, labs and other pertaining documents and studies sent. Clinical questions answered. Awaiting determination from insurance company.     Turnaround time for your insurance to make a decision on your Prior Authorization can take 7-21 business days.

## 2025-04-30 ENCOUNTER — RESULTS FOLLOW-UP (OUTPATIENT)
Dept: FAMILY MEDICINE CLINIC | Facility: CLINIC | Age: 55
End: 2025-04-30

## 2025-04-30 NOTE — TELEPHONE ENCOUNTER
PA for     tadalafil (CIALIS) 10 MG tablet   EXCLUDED from plan       Reason:(Screenshot if applicable)        Message sent to office clinical pool Yes

## 2025-07-03 DIAGNOSIS — Z00.6 ENCOUNTER FOR EXAMINATION FOR NORMAL COMPARISON OR CONTROL IN CLINICAL RESEARCH PROGRAM: ICD-10-CM

## 2025-07-08 ENCOUNTER — APPOINTMENT (OUTPATIENT)
Age: 55
End: 2025-07-08

## 2025-07-08 DIAGNOSIS — Z00.6 ENCOUNTER FOR EXAMINATION FOR NORMAL COMPARISON OR CONTROL IN CLINICAL RESEARCH PROGRAM: ICD-10-CM

## 2025-07-08 PROCEDURE — 36415 COLL VENOUS BLD VENIPUNCTURE: CPT

## 2025-07-18 LAB
APOB+LDLR+PCSK9 GENE MUT ANL BLD/T: NOT DETECTED
BRCA1+BRCA2 DEL+DUP + FULL MUT ANL BLD/T: NOT DETECTED
MLH1+MSH2+MSH6+PMS2 GN DEL+DUP+FUL M: NOT DETECTED